# Patient Record
Sex: MALE | Race: WHITE | NOT HISPANIC OR LATINO | Employment: OTHER | URBAN - METROPOLITAN AREA
[De-identification: names, ages, dates, MRNs, and addresses within clinical notes are randomized per-mention and may not be internally consistent; named-entity substitution may affect disease eponyms.]

---

## 2022-08-23 ENCOUNTER — OFFICE VISIT (OUTPATIENT)
Dept: OBGYN CLINIC | Facility: CLINIC | Age: 67
End: 2022-08-23
Payer: MEDICARE

## 2022-08-23 ENCOUNTER — APPOINTMENT (OUTPATIENT)
Dept: RADIOLOGY | Facility: CLINIC | Age: 67
End: 2022-08-23
Payer: MEDICARE

## 2022-08-23 VITALS
SYSTOLIC BLOOD PRESSURE: 149 MMHG | WEIGHT: 225 LBS | HEART RATE: 65 BPM | BODY MASS INDEX: 30.48 KG/M2 | HEIGHT: 72 IN | DIASTOLIC BLOOD PRESSURE: 82 MMHG

## 2022-08-23 DIAGNOSIS — M25.512 LEFT SHOULDER PAIN, UNSPECIFIED CHRONICITY: ICD-10-CM

## 2022-08-23 DIAGNOSIS — S46.012A ROTATOR CUFF STRAIN, LEFT, INITIAL ENCOUNTER: Primary | ICD-10-CM

## 2022-08-23 PROCEDURE — 99204 OFFICE O/P NEW MOD 45 MIN: CPT | Performed by: ORTHOPAEDIC SURGERY

## 2022-08-23 PROCEDURE — 73030 X-RAY EXAM OF SHOULDER: CPT

## 2022-08-23 RX ORDER — MONTELUKAST SODIUM 10 MG/1
TABLET ORAL
COMMUNITY
Start: 2022-06-02

## 2022-08-23 RX ORDER — ATORVASTATIN CALCIUM 40 MG/1
TABLET, FILM COATED ORAL
COMMUNITY
Start: 2022-06-02

## 2022-08-23 RX ORDER — ALLOPURINOL 300 MG/1
TABLET ORAL
COMMUNITY
Start: 2022-06-02

## 2022-08-23 RX ORDER — COLCHICINE 0.6 MG/1
TABLET ORAL
COMMUNITY
Start: 2022-07-08

## 2022-08-23 RX ORDER — LEVOCETIRIZINE DIHYDROCHLORIDE 5 MG/1
TABLET, FILM COATED ORAL
COMMUNITY
Start: 2022-06-02

## 2022-08-23 NOTE — PROGRESS NOTES
Assessment/Plan:  1  Rotator cuff strain, left, initial encounter  Ambulatory Referral to Physical Therapy   2  Left shoulder pain, unspecified chronicity  XR shoulder 2+ vw left       Scribe Attestation    I,:  Felipa Gibson am acting as a scribe while in the presence of the attending physician :       I,:  Julio Perry MD personally performed the services described in this documentation    as scribed in my presence :             Lizy Garcia upon exam today and review of his left shoulder x-ray demonstrates signs and symptoms consistent with a rotator cuff strain  He has good functional range of motion but is painful  He demonstrates decreased strength in abduction  I do believe non operative measures of most appropriate at this time  Formal physical therapy focusing on strength of the rotator cuff will most likely help alleviate his pain  However if symptoms worsen or fail to improve with physical therapy we will consider ordering an MRI to determine a possible rotator cuff tear  He will follow up with me in 6 weeks  Subjective:   Tyrone Daigle is a 78 y o  male who presents to the office today for initial evaluation of left shoulder  5 years ago fell off a snowblower and immobilized the shoulder but recovered within a few months with no major complications reported  About 1 month ago he noticed an intense flare up of pain  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  He denotes pain when lifting the arm  He denies numbness and tingling  Resting and limiting arm responsibilities have helped alleviate some of his symptoms  Review of Systems   Constitutional: Negative for chills, fever and unexpected weight change  HENT: Negative for nosebleeds and sore throat  Eyes: Negative for pain, redness and visual disturbance  Respiratory: Negative for cough, shortness of breath and wheezing  Cardiovascular: Negative for chest pain, palpitations and leg swelling  Gastrointestinal: Negative for nausea and vomiting  Endocrine: Negative for polydipsia and polyuria  Genitourinary: Negative for dysuria and hematuria  Musculoskeletal: Positive for arthralgias and myalgias  Skin: Negative for rash and wound  Neurological: Negative for dizziness, numbness and headaches  Psychiatric/Behavioral: Negative for decreased concentration  The patient is not nervous/anxious  Past Medical History:   Diagnosis Date    Asthma     Hypercholesterolemia        Past Surgical History:   Procedure Laterality Date    BUNIONECTOMY Bilateral     FOOT SURGERY Right     INGUINAL HERNIA REPAIR      SKIN CANCER EXCISION      Malignant Melanoma on back removed    UMBILICAL HERNIA REPAIR      VASECTOMY      WISDOM TOOTH EXTRACTION         History reviewed  No pertinent family history  Social History     Occupational History    Not on file   Tobacco Use    Smoking status: Never Smoker    Smokeless tobacco: Never Used   Vaping Use    Vaping Use: Never used   Substance and Sexual Activity    Alcohol use: Not on file    Drug use: Never    Sexual activity: Not on file         Current Outpatient Medications:     allopurinol (ZYLOPRIM) 300 mg tablet, , Disp: , Rfl:     atorvastatin (LIPITOR) 40 mg tablet, , Disp: , Rfl:     colchicine (COLCRYS) 0 6 mg tablet, , Disp: , Rfl:     Fluticasone-Salmeterol (ADVAIR DISKUS IN), Inhale, Disp: , Rfl:     levocetirizine (XYZAL) 5 MG tablet, , Disp: , Rfl:     montelukast (SINGULAIR) 10 mg tablet, , Disp: , Rfl:     Ventolin  (90 Base) MCG/ACT inhaler, if needed, Disp: , Rfl:     Allergies   Allergen Reactions    Zithromax [Azithromycin] Rash       Objective:  Vitals:    08/23/22 0903   BP: 149/82   Pulse: 65       Left Shoulder Exam     Tenderness   Left shoulder tenderness location: Subacromion and bicipital groove      Range of Motion   Active abduction: 120   External rotation: 90   Internal rotation 0 degrees: T12 Muscle Strength   Abduction: 4/5   Internal rotation: 5/5   External rotation: 5/5   Supraspinatus: 4/5     Other   Erythema: absent  Scars: absent  Sensation: normal  Pulse: present             Physical Exam  HENT:      Head: Normocephalic and atraumatic  Eyes:      General:         Right eye: No discharge  Left eye: No discharge  Conjunctiva/sclera: Conjunctivae normal       Pupils: Pupils are equal, round, and reactive to light  Cardiovascular:      Rate and Rhythm: Normal rate  Pulmonary:      Effort: Pulmonary effort is normal  No respiratory distress  Musculoskeletal:      Right shoulder: Normal range of motion  Decreased strength  Cervical back: Normal range of motion and neck supple  Comments: As noted in HPI   Skin:     General: Skin is warm and dry  Neurological:      Mental Status: He is alert and oriented to person, place, and time  I have personally reviewed pertinent films in PACS and my interpretation is as follows:  X-ray of his left shoulder obtained today demonstrate no obvious acute fracture or osseous abnormality

## 2022-08-29 ENCOUNTER — EVALUATION (OUTPATIENT)
Dept: PHYSICAL THERAPY | Facility: CLINIC | Age: 67
End: 2022-08-29
Payer: MEDICARE

## 2022-08-29 DIAGNOSIS — S46.012A ROTATOR CUFF STRAIN, LEFT, INITIAL ENCOUNTER: ICD-10-CM

## 2022-08-29 DIAGNOSIS — M25.512 ACUTE PAIN OF LEFT SHOULDER: Primary | ICD-10-CM

## 2022-08-29 PROCEDURE — 97110 THERAPEUTIC EXERCISES: CPT | Performed by: PHYSICAL THERAPIST

## 2022-08-29 PROCEDURE — 97161 PT EVAL LOW COMPLEX 20 MIN: CPT | Performed by: PHYSICAL THERAPIST

## 2022-08-29 NOTE — PROGRESS NOTES
Today's date: 2022  Patient name: Laura Walker  : [birth year]  MRN: [id number]  Referring provider: Mark Larose*  Dx:   Encounter Diagnoses   Name Primary?  Rotator cuff strain, left, initial encounter     Acute pain of left shoulder Yes       Assessment:  Laura Walker is a 78 y o  male who presents with poor left shoulder scapulohumeral rhythm, hypertonicity surrounding left shoulder, left shoulder pain, decreased strength, decreased ROM and postural  dysfunction  Due to these impairments, patient has difficulty performing recreational activities, work-related activities, lifting/carrying, reaching  Patient's clinical presentation is consistent with that of the referring diagnosis  Patient has been educated in postural education, home exercise program and plan of care  Patient would benefit from skilled physical therapy services to address their aforementioned functional limitations and progress towards prior level of function and independence with home exercise program        Impairments:  pain, decreased strength, decreased ROM and postural dysfunction    Understanding of Dx/Px/POC: good  Prognosis: good    Goals:  STG:  to be achieved within 2-4 weeks  1  Pt will demo 50% improvement in shoulder AROM to improve self care and household ADLs   2  Improve shoulder strength in all deficient planes by 1/2 MMT  3  Pt will have good understanding of HEP  4  Pt will demo good sitting and standing posture  LTG:  to be achieved within 4-8 weeks  1  Improve shoulder strength to be able to operate his tractor >1 hour without pain  2  Improve strength to be able to put dishes into an overhead cabinet  3  Improve shoulder strength so that pt can lift 10 to shoulder height  4  Pt will be able to sleep without disturbance secondary to shoulder pain    5  Pt will be I with comprehensive HEP    Plan:  Plan Details:   Patient will benefit from PT eval and skilled PT that will include HEP development, stretching, strengthening, A/AA/PROM, joint mobilizations, posture education, STM/MI as needed to reduce muscle tension, therapeutic exercise, manual therapy, neuromuscular reeducation, PLOC discussed and agreed upon with patient  Planned modalities: Cold/Hot Packs, Estim prn  Frequency: 2x/wk  Duration in weeks: 6 weeks  Treatment plan discussed with: patient    Subjective:    History of Injury:  Serg Alonzo reports in 2017 he fell off a truck landing on left shoulder  He states it was sore for a couple weeks but healed on its own  He states at home he constantly  He states in about June he had a gradual onset of left shoulder pain to the point where he could barely lift his arm  He believes it has to do with the repititive use of operating his tractor which demands him to bring his arm forward and backward repetitively  He states he took a rest from using the machine and his pain did improve  He recently saw the orthopedist who took x-rays which came back normal   He has now bene referred to OPPT prior to further imaging  Pain Level:  Pain Location: Left anterior shoulder  Pain Type: Discomfort  Worst: 7/10  Best: 0/10  Current: 0/10     Aggravating factors:  Lifting overhead, reaching out to side, in front or overhead, operating tractor, intermittent disturbances to sleep  As per  FOTO pt reports:  "Some difficulty" with:    - Lower a lightweight object (1-5 lb ) from the top shelf of a closet    - Carrying a heavy object (over 10lbs)    - Reach a shelf that is at shoulder height   "Little bit of difficulty" with:    - Move a heavy skillet from one stove burner to another    - Place a can of soup (1lb) on a shelf at shoulder height     Flowsheet Rows    Flowsheet Row Most Recent Value   PT/OT G-Codes    Current Score 58   Projected Score 72           Social Support:  Lives with: Spouse  Employment Status: Retired but active outdoors doing heavy yardwork    Hand Dominance: right    Patient Goals:  Decrease Pain, Improve Motion, Improve Strength and Return to recreation      Objective Measures:    Postural Observations:  Fair, forward shoulders    Palpation:  Patient with TTP at left Biceps Tendon  Patient with hypertonicity at left Biceps Tendon, Pec Minor, Infraspinatus and Teres Minor    A/PROM IE IE      Left Right Left Right   Shld Flex 151/163 151/165     Shld Abd 99*/140* 163/162     Shld IR T8/30 T8/30     Shld ER T2/80 T2/90         MMT IE IE      Left Right Left Right   Shld Flex 4+/5* 5/5     Shld Abd 3+/5* 4+/5     Shld IR 5/5 5/5     Shld ER 4/5* 5/5      (*) denotes pain with testing  Patient demonstrated significant forward protrusion of scapular bilaterally during testing  Special Tests:  Left side:  Empty Can Test positive, Hawkin's Impingement Test negative, Neer's Impingement Test negative and Cross Body Adduction Test negative    Right side:  Empty Can Test negative, Hawkin's Impingement Test negative, Neer's Impingement Test negative and Cross Body Adduction Test negative    Joint Play:  Left Side:   Inferior Capsule: hypomobile   Posterior Capsule: hypomobile   Anterior Capsule: WNL  Right Side:   Inferior Capsule: hypomobile   Posterior Capsule: hypomobile   Anterior Capsule: WNL    Scapular Positioning/Mobility   Left: Forward protruded in sitting, unable to retract both passively or actively  Right: Forward protruded in sitting, unable to retract both passively or actively  CS Screen:   ROM: WNL   Sensation: Soft touch intact bilaterally    Flowsheet Rows    Flowsheet Row Most Recent Value   PT/OT G-Codes    Current Score 58   Projected Score 72          Precautions:  Access Code: [email or web address]  URL: [email or web address]  Date: 08/29/2022  Prepared by: Reyna Marley    Exercises  · Supine Scapular Retraction - 1-2 x daily - 7 x weekly - 2 sets - 10 reps - 5 hold  · Supine Shoulder Flexion AAROM with Dowel - 1-2 x daily - 7 x weekly - 2-3 sets - 10 reps  · Supine Shoulder External Rotation in 45 Degrees Abduction AAROM with Dowel - 1-2 x daily - 7 x weekly - 2-3 sets - 10 reps          Manuals  8/29     IE                  Neuro Re-Ed                                        Ther Ex     Sup scap retr  15x5s   Sup cane flex  15x   Sup cane 90/90 ER  15x

## 2022-08-31 ENCOUNTER — OFFICE VISIT (OUTPATIENT)
Dept: PHYSICAL THERAPY | Facility: CLINIC | Age: 67
End: 2022-08-31
Payer: MEDICARE

## 2022-08-31 DIAGNOSIS — M25.512 ACUTE PAIN OF LEFT SHOULDER: ICD-10-CM

## 2022-08-31 DIAGNOSIS — S46.012A ROTATOR CUFF STRAIN, LEFT, INITIAL ENCOUNTER: Primary | ICD-10-CM

## 2022-08-31 PROCEDURE — 97110 THERAPEUTIC EXERCISES: CPT | Performed by: PHYSICAL THERAPIST

## 2022-08-31 PROCEDURE — 97112 NEUROMUSCULAR REEDUCATION: CPT | Performed by: PHYSICAL THERAPIST

## 2022-08-31 NOTE — PROGRESS NOTES
Daily Note     Today's date: 2022  Patient name: Laura Walker  :   MRN: [id number]  Referring provider: Mark Larose*  Dx:   Encounter Diagnosis     ICD-10-CM    1  Rotator cuff strain, left, initial encounter  S46 012A    2  Acute pain of left shoulder  M25 512                   Subjective: Laura Walker reports he doesn't feel much of a difference since starting PT  Objective: See treatment diary below      Assessment: Tolerated treatment well  Patient demonstrated fatigue post treatment with extreme difficulty coordinating scapular movement into depression and retraction as he consistently overuses upper trap/lev scap  He required verbal, visual and tactile cues to improve but still had difficulty  He was able to finally achieve during [facility] extension with band  Plan: Continue per plan of care  Precautions:  Access Code: [email or web address]  URL: [email or web address]  Date: 2022  Prepared by: Ninfa Mensah    IE   Manuals     2 IE                  Neuro Re-Ed     SL scap ret/pro 20x MWM by PT    SL scap elev/dep 20x    Prone scap retr PT AA into 5s hold then eccentric lower   3x10    Band GH ext 3x10 YTB requires verbal/tactile cues for performance                     Ther Ex     Sup scap retr 15x5s 15x5s   Sup cane flex 2x10 15x   Sup cane 90/90 ER 2x10 15x   Door pec str 4x30s

## 2022-09-07 ENCOUNTER — OFFICE VISIT (OUTPATIENT)
Dept: PHYSICAL THERAPY | Facility: CLINIC | Age: 67
End: 2022-09-07
Payer: MEDICARE

## 2022-09-07 DIAGNOSIS — S46.012A ROTATOR CUFF STRAIN, LEFT, INITIAL ENCOUNTER: Primary | ICD-10-CM

## 2022-09-07 DIAGNOSIS — M25.512 ACUTE PAIN OF LEFT SHOULDER: ICD-10-CM

## 2022-09-07 PROCEDURE — 97110 THERAPEUTIC EXERCISES: CPT | Performed by: PHYSICAL THERAPIST

## 2022-09-07 PROCEDURE — 97112 NEUROMUSCULAR REEDUCATION: CPT | Performed by: PHYSICAL THERAPIST

## 2022-09-07 NOTE — PROGRESS NOTES
Daily Note     Today's date: 2022  Patient name: Serg Alonzo  :   MRN: [id number]  Referring provider: Cleveland Harris*  Dx:   Encounter Diagnosis     ICD-10-CM    1  Rotator cuff strain, left, initial encounter  S46 012A    2  Acute pain of left shoulder  M25 512                   Subjective: Serg Alonzo states he was too aggressive with his stretches over weekend and had increased pain  He took a couple days off and it calmed down  Objective: See treatment diary below      Assessment: Tolerated treatment well  Patient demonstrated fatigue post treatment with continued difficulty recruiting scapular stabilizers and posterior RTC  Plan: Continue per plan of care  Precautions:  Access Code: [email or web address]  URL: [email or web address]  Date: 2022  Prepared by: Laura Fields    IE   Manuals     3 2 IE                     Neuro Re-Ed      SL scap ret/pro  20x MWM by PT    SL scap elev/dep  20x    Prone scap retr  PT AA into 5s hold then eccentric lower   3x10    Band GH ext 3x10 YTB 5s 3x10 YTB requires verbal/tactile cues for performance      Wall serratus push ups 2x10 5s     Band ER YTB 2x15 cues for scap stab     Prone horizontal abd in er 3x10 0lb     Ther Ex      Sup scap retr  15x5s 15x5s   Sup cane flex 20x 2x10 15x   Sup cane 90/90 ER 20x 2x10 15x   Door pec str 4x30s 4x30s    Door flex str 4x30s

## 2022-09-09 ENCOUNTER — OFFICE VISIT (OUTPATIENT)
Dept: PHYSICAL THERAPY | Facility: CLINIC | Age: 67
End: 2022-09-09
Payer: MEDICARE

## 2022-09-09 DIAGNOSIS — M25.512 ACUTE PAIN OF LEFT SHOULDER: ICD-10-CM

## 2022-09-09 DIAGNOSIS — S46.012A ROTATOR CUFF STRAIN, LEFT, INITIAL ENCOUNTER: Primary | ICD-10-CM

## 2022-09-09 PROCEDURE — 97110 THERAPEUTIC EXERCISES: CPT | Performed by: PHYSICAL THERAPIST

## 2022-09-09 PROCEDURE — 97112 NEUROMUSCULAR REEDUCATION: CPT | Performed by: PHYSICAL THERAPIST

## 2022-09-09 NOTE — PROGRESS NOTES
Daily Note     Today's date: 2022  Patient name: Tyrone Daigle  :   MRN: [id number]  Referring provider: Juan Eisenberg*  Dx:   Encounter Diagnosis     ICD-10-CM    1  Rotator cuff strain, left, initial encounter  S46 012A    2  Acute pain of left shoulder  M25 512                   Subjective: Tyrone Daigle reports he wakes up stiff and sore especially if he wakes up on his side  Objective: See treatment diary below      Assessment: Tolerated treatment well  Patient demonstrated fatigue post treatment with low posterior cuff strength/endurance and continued difficulty coordinating movements and recruitment of posterior musculature  He continues to be pec and biceps dominant with movements  Plan: Continue per plan of care  Precautions:  Access Code: [email or web address]  URL: [email or web address]"/  Date: 2022  Prepared by: Juan F Ralph    IE   Manuals     4 3 2 IE   Post cap str 20x3s                    Neuro Re-Ed       SL scap ret/pro   20x MWM by PT    SL scap elev/dep   20x    Prone scap retr   PT AA into 5s hold then eccentric lower   3x10    Band GH ext  3x10 YTB 5s 3x10 YTB requires verbal/tactile cues for performance      Wall serratus push ups 2x10 5s 2x10 5s     Band ER  YTB 2x15 cues for scap stab     Seated 45/90 ER 3x10 0lb      SL ER 3x10 0lb      SL gh abd 10x w/TCs for scap dep      Prone horizontal abd in er 3x10 5s 0lb  3x10 0lb     Ther Ex       Sup scap retr   15x5s 15x5s   Sup cane flex 20x 20x 2x10 15x   Sup cane 90/90 ER 20x 20x 2x10 15x   Door pec str 4x30s 4x30s 4x30s    Door flex str 4x30s 4x30s

## 2022-09-12 ENCOUNTER — OFFICE VISIT (OUTPATIENT)
Dept: PHYSICAL THERAPY | Facility: CLINIC | Age: 67
End: 2022-09-12
Payer: MEDICARE

## 2022-09-12 DIAGNOSIS — S46.012A ROTATOR CUFF STRAIN, LEFT, INITIAL ENCOUNTER: Primary | ICD-10-CM

## 2022-09-12 DIAGNOSIS — M25.512 ACUTE PAIN OF LEFT SHOULDER: ICD-10-CM

## 2022-09-12 PROCEDURE — 97112 NEUROMUSCULAR REEDUCATION: CPT | Performed by: PHYSICAL THERAPIST

## 2022-09-12 PROCEDURE — 97110 THERAPEUTIC EXERCISES: CPT | Performed by: PHYSICAL THERAPIST

## 2022-09-12 NOTE — PROGRESS NOTES
Daily Note     Today's date: 2022  Patient name: Leslie Weiss  :   MRN: [id number]  Referring provider: Harinderalyssia Andres*  Dx:   Encounter Diagnosis     ICD-10-CM    1  Rotator cuff strain, left, initial encounter  S46 012A    2  Acute pain of left shoulder  M25 512                   Subjective: Leslie Weiss reports he had increased soreness in front of shoulder after last session but recovered well with his HEP of stretches  Objective: See treatment diary below       Assessment: Tolerated treatment well  Patient demonstrated fatigue post treatment while continuing with difficulty recruiting scapular stabilizers and inhibiting upper traps/pecs  He did perform "no money" well as way to recruit posterior cuff without significant overuse of surrounding musculature  Plan: Continue per plan of care  Precautions:  Access Code: [email or web address]  URL: [email or web address]  Date: 2022  Prepared by: Enrrique Blanca    IE   Manuals     5 4 3 2 IE   Post cap str  20x3s                      Neuro Re-Ed        SL scap ret/pro    20x MWM by PT    SL scap elev/dep    20x    Prone scap retr    PT AA into 5s hold then eccentric lower   3x10    Band GH ext 3x10 RTB requires cues for core activation and scap stab  3x10 YTB 5s 3x10 YTB requires verbal/tactile cues for performance      Sup 90/90 ER iso into MR by PT 15x5s       Sup D2 flex/ext 2x10        Wall serratus push ups Sup punch 2x10 5s 2x10 5s 2x10 5s     Band ER YTB 3x10  YTB 2x15 cues for scap stab     Seated 45/90 ER  3x10 0lb      SL ER  3x10 0lb      SL gh abd 2x10 TC for scap position 10x w/TCs for scap dep      Prone horizontal abd in er  3x10 5s 0lb  3x10 0lb     Ther Ex        Sup scap retr    15x5s 15x5s   Sup cane flex 20x 20x 20x 2x10 15x   Sup cane 90/90 ER 20x 20x 20x 2x10 15x   Door pec str  4x30s 4x30s 4x30s    Door flex str  4x30s 4x30s     0 abd ER (no money) 20x +  2x10 YTB

## 2022-09-16 ENCOUNTER — OFFICE VISIT (OUTPATIENT)
Dept: PHYSICAL THERAPY | Facility: CLINIC | Age: 67
End: 2022-09-16
Payer: MEDICARE

## 2022-09-16 DIAGNOSIS — M25.512 ACUTE PAIN OF LEFT SHOULDER: ICD-10-CM

## 2022-09-16 DIAGNOSIS — S46.012A ROTATOR CUFF STRAIN, LEFT, INITIAL ENCOUNTER: Primary | ICD-10-CM

## 2022-09-16 PROCEDURE — 97112 NEUROMUSCULAR REEDUCATION: CPT | Performed by: PHYSICAL THERAPIST

## 2022-09-16 PROCEDURE — 97110 THERAPEUTIC EXERCISES: CPT | Performed by: PHYSICAL THERAPIST

## 2022-09-16 NOTE — PROGRESS NOTES
Daily Note     Today's date: 2022  Patient name: Chin Sellers  :   MRN: [id number]  Referring provider: Velia Nguyen*  Dx:   Encounter Diagnosis     ICD-10-CM    1  Rotator cuff strain, left, initial encounter  S46 012A    2  Acute pain of left shoulder  M25 512                   Subjective: Chin Sellers reports he was having a little pain when attempting to lift arm out to side so he held off since last session  Objective: See treatment diary below      Assessment: Tolerated treatment well  Patient demonstrated fatigue post treatment in posterior RTC but with use of gravity and proprioceptive feedback from wall he did a better job at isolating posterior muscle groups and inhibiting anterior musculature  Plan: Continue per plan of care  Precautions:  Access Code: [email or web address]  URL: [email or web address]  Date: 2022  Prepared by: Mary WEST   Manuals     6 5 4 3 2   Post cap str   20x3s                     Neuro Re-Ed        SL scap ret/pro     20x MWM by PT   SL scap elev/dep     20x   Prone scap retr     PT AA into 5s hold then eccentric lower   3x10   Band GH ext  3x10 RTB requires cues for core activation and scap stab  3x10 YTB 5s 3x10 YTB requires verbal/tactile cues for performance     Sup 90/90 ER iso into MR by PT  15x5s      Sup D2 flex/ext 3x10 0lb 2x10       Stand GH flex/scap/abd to 90 10x ea w/cues for scap dep       Wall serratus push ups  Sup punch 2x10 5s 2x10 5s 2x10 5s    Band ER  YTB 3x10  YTB 2x15 cues for scap stab    Seated 45/90 ER Seated 90/90 3x10 0lb  3x10 0lb     SL ER   3x10 0lb     SL gh abd  2x10 TC for scap position 10x w/TCs for scap dep     Prone horizontal abd in er 3x10 0lb  3x10 5s 0lb  3x10 0lb    Ther Ex        Sup scap retr     15x5s   Sup cane flex  20x 20x 20x 2x10   Sup cane 90/90 ER  20x 20x 20x 2x10   Door pec str 4x30s  4x30s 4x30s 4x30s   Door flex str   4x30s 4x30s    0 abd ER (no money) Back to wall 3x10 5s 20x +  2x10 YTB      Prone GH ext Focus on scap dep 3x10 0lb       Sup hands behind head pec str 3x10 4s

## 2022-09-19 ENCOUNTER — OFFICE VISIT (OUTPATIENT)
Dept: PHYSICAL THERAPY | Facility: CLINIC | Age: 67
End: 2022-09-19
Payer: MEDICARE

## 2022-09-19 DIAGNOSIS — M25.512 ACUTE PAIN OF LEFT SHOULDER: ICD-10-CM

## 2022-09-19 DIAGNOSIS — S46.012A ROTATOR CUFF STRAIN, LEFT, INITIAL ENCOUNTER: Primary | ICD-10-CM

## 2022-09-19 PROCEDURE — 97112 NEUROMUSCULAR REEDUCATION: CPT | Performed by: PHYSICAL THERAPIST

## 2022-09-19 PROCEDURE — 97110 THERAPEUTIC EXERCISES: CPT | Performed by: PHYSICAL THERAPIST

## 2022-09-19 NOTE — PROGRESS NOTES
Daily Note     Today's date: 2022  Patient name: Kandace Lobo  :   MRN: [id number]  Referring provider: Kristina Dunn*  Dx:   Encounter Diagnosis     ICD-10-CM    1  Rotator cuff strain, left, initial encounter  S46 012A    2  Acute pain of left shoulder  M25 512                   Subjective: Kandace Lobo states he had a good weekend as he was able to use his shoulder with little to no pain  He states he also recovered well from PT session last week  Objective: See treatment diary below      Assessment: Tolerated treatment well  Patient demonstrated fatigue post treatment with continued low strength and endurance in posterior RTC impacting scapulohumeral rhythm with elevation of arm against gravity  Plan: Continue per plan of care  Precautions: Standard    Access Code: [email or web address]  URL: [email or web address]  Date: 2022  Prepared by: Ludivina Jennings    Exercises  · Supine Scapular Retraction - 1-2 x daily - 7 x weekly - 2 sets - 10 reps - 5 hold  · Doorway Pec Stretch at 90 Degrees Abduction - 2-3 x daily - 7 x weekly - 3 sets - 30 hold  · Supine Shoulder Flexion AAROM with Dowel - 1 x daily - 7 x weekly - 2-3 sets - 10 reps  · Supine Shoulder External Rotation in 45 Degrees Abduction AAROM with Dowel - 1 x daily - 7 x weekly - 2-3 sets - 10 reps  · Supine Chest Stretch with Elbows Bent - 1 x daily - 7 x weekly - 2 sets - 10 reps - 5 hold  · Prone Shoulder Extension - Single Arm - 3-4 x weekly - 3 sets - 10 reps  · Prone Shoulder Horizontal Abduction - 3-4 x weekly - 3 sets - 10 reps - 5 hold  · Shoulder External Rotation and Scapular Retraction - 3-4 x weekly - 3 sets - 10 reps  · Seated Shoulder External Rotation in Abduction Supported with Dumbbell - 3-4 x weekly - 3 sets - 10 reps          IE   Manuals     7 6 5 4 3   Post cap str    20x3s                    Neuro Re-Ed        SL scap ret/pro        SL scap elev/dep        Prone scap retr        Band GH ext   3x10 RTB requires cues for core activation and scap stab  3x10 YTB 5s   Sup 90/90 ER iso into MR by PT   15x5s     Sup D2 flex/ext  3x10 0lb 2x10      Stand GH flex/scap/abd to 90 10x ea w/ TCs 10x ea w/cues for scap dep      Wall serratus push ups   Sup punch 2x10 5s 2x10 5s 2x10 5s   Band ER   YTB 3x10  YTB 2x15 cues for scap stab   Seated 45/90 ER 90/90 3x10 0lb Seated 90/90 3x10 0lb  3x10 0lb    SL ER    3x10 0lb    SL gh abd   2x10 TC for scap position 10x w/TCs for scap dep    Prone horizontal abd in er 3x10 0lb  3x10 0lb  3x10 5s 0lb  3x10 0lb   Ther Ex        Sup scap retr        Sup cane flex   20x 20x 20x   Sup cane 90/90 ER   20x 20x 20x   Door pec str 4x30s 4x30s  4x30s 4x30s   Door flex str    4x30s 4x30s   0 abd ER (no money) Back to wall 3x10 5s Back to wall 3x10 5s 20x +  2x10 YTB     Prone GH ext 3x10 3lb Focus on scap dep 3x10 0lb      Sup hands behind head pec str 2x10 5s 3x10 4s

## 2022-09-21 ENCOUNTER — OFFICE VISIT (OUTPATIENT)
Dept: PHYSICAL THERAPY | Facility: CLINIC | Age: 67
End: 2022-09-21
Payer: MEDICARE

## 2022-09-21 DIAGNOSIS — S46.012A ROTATOR CUFF STRAIN, LEFT, INITIAL ENCOUNTER: Primary | ICD-10-CM

## 2022-09-21 DIAGNOSIS — M25.512 ACUTE PAIN OF LEFT SHOULDER: ICD-10-CM

## 2022-09-21 PROCEDURE — 97110 THERAPEUTIC EXERCISES: CPT

## 2022-09-21 PROCEDURE — 97112 NEUROMUSCULAR REEDUCATION: CPT

## 2022-09-21 NOTE — PROGRESS NOTES
Daily Note     Today's date: 2022  Patient name: Earl Redman  :   MRN: [id number]  Referring provider: Sally Carnes*  Dx:   Encounter Diagnosis     ICD-10-CM    1  Rotator cuff strain, left, initial encounter  S46 012A    2  Acute pain of left shoulder  M25 512                   Subjective: Earl Redman reports fatigue after last session but denies pain  He notes he has been working on his exercises at home but had a tough time doing the prone T's because his bed is soft  Objective: See treatment diary below      Assessment: Tolerated treatment well  Cues required to avoid compensatory shoulder hike and anterior tilt  Improved performance of prone T with tactile and verbal cues  Educated to perform off corner of bed rather than side of bed to improve his stability  Tactile and verbal cues required throughout session to improve form and reduce compensations  Patient exhibited good technique with therapeutic exercises and would benefit from continued PT to progress strength and coordination of GHJ and scapula  Plan: Continue per plan of care  Progress treatment as tolerated  Precautions: Standard    Access Code: [email or web address]  URL: [email or web address]  Date: 2022  Prepared by: Jeremiah Richard    Exercises  · Supine Scapular Retraction - 1-2 x daily - 7 x weekly - 2 sets - 10 reps - 5 hold  · Doorway Pec Stretch at 90 Degrees Abduction - 2-3 x daily - 7 x weekly - 3 sets - 30 hold  · Supine Shoulder Flexion AAROM with Dowel - 1 x daily - 7 x weekly - 2-3 sets - 10 reps  · Supine Shoulder External Rotation in 45 Degrees Abduction AAROM with Dowel - 1 x daily - 7 x weekly - 2-3 sets - 10 reps  · Supine Chest Stretch with Elbows Bent - 1 x daily - 7 x weekly - 2 sets - 10 reps - 5 hold  · Prone Shoulder Extension - Single Arm - 3-4 x weekly - 3 sets - 10 reps  · Prone Shoulder Horizontal Abduction - 3-4 x weekly - 3 sets - 10 reps - 5 hold  · Shoulder External Rotation and Scapular Retraction - 3-4 x weekly - 3 sets - 10 reps  · Seated Shoulder External Rotation in Abduction Supported with Dumbbell - 3-4 x weekly - 3 sets - 10 reps          IE 8/29  Manuals 9/21 9/19 9/16 9/12 9/9    8 7 6 5 4   Post cap str     20x3s                   Neuro Re-Ed        SL scap ret/pro        SL scap elev/dep        Prone scap retr        Band GH ext    3x10 RTB requires cues for core activation and scap stab    Sup 90/90 ER iso into MR by PT    15x5s    Sup D2 flex/ext Supine 3x10 with cues   3x10 0lb 2x10     Stand GH flex/scap/abd to 90  10x ea w/ TCs 10x ea w/cues for scap dep     Wall serratus push ups    Sup punch 2x10 5s 2x10 5s   Band ER    YTB 3x10    Seated 45/90 ER  90/90 3x10 0lb Seated 90/90 3x10 0lb  3x10 0lb   SL ER     3x10 0lb   SL gh abd 3x10 tactile and verbal cues for scap    2x10 TC for scap position 10x w/TCs for scap dep   SL flexion  10x with cues        Prone horizontal abd in er 2x10  3x10 0lb  3x10 0lb  3x10 5s 0lb    Ther Ex        Sup scap retr        Sup cane flex    20x 20x   Sup cane 90/90 ER    20x 20x   Door pec str 3x30s  4x30s 4x30s  4x30s   Door flex str     4x30s   0 abd ER (no money) 15x with tactile cues   2x10 back at wall  Back to wall 3x10 5s Back to wall 3x10 5s 20x +  2x10 YTB    Prone GH ext  3x10 3lb Focus on scap dep 3x10 0lb     Sup hands behind head pec str 10x 5s hold 2x10 5s 3x10 4s

## 2022-09-26 ENCOUNTER — OFFICE VISIT (OUTPATIENT)
Dept: PHYSICAL THERAPY | Facility: CLINIC | Age: 67
End: 2022-09-26
Payer: MEDICARE

## 2022-09-26 DIAGNOSIS — S46.012A ROTATOR CUFF STRAIN, LEFT, INITIAL ENCOUNTER: Primary | ICD-10-CM

## 2022-09-26 DIAGNOSIS — M25.512 ACUTE PAIN OF LEFT SHOULDER: ICD-10-CM

## 2022-09-26 PROCEDURE — 97112 NEUROMUSCULAR REEDUCATION: CPT

## 2022-09-26 PROCEDURE — 97110 THERAPEUTIC EXERCISES: CPT

## 2022-09-26 NOTE — PROGRESS NOTES
Daily Note     Today's date: 2022  Patient name: Mike Lopez  :   MRN: [id number]  Referring provider: Barrington Little*  Dx:   Encounter Diagnosis     ICD-10-CM    1  Rotator cuff strain, left, initial encounter  S46 012A    2  Acute pain of left shoulder  M25 512                   Subjective: Mike Lopez reports he was working on the exercises at home this weekend and feels like he was able to get the bilateral external rotation down better  He notes he still couldn't do the one laying on his stomach  Objective: See treatment diary below      Assessment: Tolerated treatment well  He continues to have difficulty coordinating scapular retractions, improved with use of foam roller posteriorly  Improved performance of bilateral shoulder ER today  Patient demonstrated fatigue post treatment, exhibited good technique with therapeutic exercises and would benefit from continued PT to progress mobility, strength and coordination  Plan: Continue per plan of care  Progress treatment as tolerated  Precautions: Standard    Access Code: [email or web address]  URL: [email or web address]  Date: 2022  Prepared by: Mohamud Stevenson    Exercises  · Supine Scapular Retraction - 1-2 x daily - 7 x weekly - 2 sets - 10 reps - 5 hold  · Doorway Pec Stretch at 90 Degrees Abduction - 2-3 x daily - 7 x weekly - 3 sets - 30 hold  · Supine Shoulder Flexion AAROM with Dowel - 1 x daily - 7 x weekly - 2-3 sets - 10 reps  · Supine Shoulder External Rotation in 45 Degrees Abduction AAROM with Dowel - 1 x daily - 7 x weekly - 2-3 sets - 10 reps  · Supine Chest Stretch with Elbows Bent - 1 x daily - 7 x weekly - 2 sets - 10 reps - 5 hold  · Prone Shoulder Extension - Single Arm - 3-4 x weekly - 3 sets - 10 reps  · Prone Shoulder Horizontal Abduction - 3-4 x weekly - 3 sets - 10 reps - 5 hold  · Shoulder External Rotation and Scapular Retraction - 3-4 x weekly - 3 sets - 10 reps  · Seated Shoulder External Rotation in Abduction Supported with Dumbbell - 3-4 x weekly - 3 sets - 10 reps          IE 8/29  Manuals 9/26 9/21 9/19 9/16 9/12    9 8 7 6 5   Post cap str        scap mobs  All directions                Neuro Re-Ed        SL scap ret/pro        SL scap elev/dep        Prone scap retr        Band GH ext     3x10 RTB requires cues for core activation and scap stab   Sup 90/90 ER iso into MR by PT     15x5s   Sup D2 flex/ext Supine 30x  Supine 3x10 with cues   3x10 0lb 2x10    Stand GH flex/scap/abd to 90   10x ea w/ TCs 10x ea w/cues for scap dep    Wall serratus push ups     Sup punch 2x10 5s   Band ER     YTB 3x10   Seated 45/90 ER   90/90 3x10 0lb Seated 90/90 3x10 0lb    SL ER        SL gh abd  3x10 tactile and verbal cues for scap    2x10 TC for scap position   SL flexion   10x with cues       Prone horizontal abd in er Prone Y arm resting on stool with cues and assist as scap 30x  2x10  3x10 0lb  3x10 0lb    Ther Ex        Sup scap retr        Sup cane flex     20x   Sup cane 90/90 ER     20x   Door pec str 3x30s  3x30s  4x30s 4x30s    Door flex str        0 abd ER (no money) 20x back at wall + ed on posture  15x with tactile cues   2x10 back at wall  Back to wall 3x10 5s Back to wall 3x10 5s 20x +  2x10 YTB   Prone GH ext   3x10 3lb Focus on scap dep 3x10 0lb    Sup hands behind head pec str  10x 5s hold 2x10 5s 3x10 4s    Standing T  Foam roller behind green tband for feedback 20x       Supine T on foam roller  10x active   10x green tband        Seated thoracic extension 2x10

## 2022-09-28 ENCOUNTER — OFFICE VISIT (OUTPATIENT)
Dept: PHYSICAL THERAPY | Facility: CLINIC | Age: 67
End: 2022-09-28
Payer: MEDICARE

## 2022-09-28 DIAGNOSIS — M25.512 ACUTE PAIN OF LEFT SHOULDER: ICD-10-CM

## 2022-09-28 DIAGNOSIS — S46.012A ROTATOR CUFF STRAIN, LEFT, INITIAL ENCOUNTER: Primary | ICD-10-CM

## 2022-09-28 PROCEDURE — 97112 NEUROMUSCULAR REEDUCATION: CPT | Performed by: PHYSICAL THERAPIST

## 2022-09-28 PROCEDURE — 97110 THERAPEUTIC EXERCISES: CPT | Performed by: PHYSICAL THERAPIST

## 2022-09-28 NOTE — PROGRESS NOTES
Daily Note     Today's date: 2022  Patient name: Tyrone Daigle  :   MRN: [id number]  Referring provider: Juan Eisenberg*  Dx:   Encounter Diagnosis     ICD-10-CM    1  Rotator cuff strain, left, initial encounter  S46 012A    2  Acute pain of left shoulder  M25 512        Start Time: 0800  Stop Time: 0845  Total time in clinic (min): 45 minutes    Subjective: Tyrone Daigle reports having "good days and bad days " States reaching [facility] is still the most difficult      Objective: See treatment diary below      Assessment: Tolerated treatment well  He continues to have difficulty coordinating scapular retractions  Some carryover noted after prone scap retractions with tactile cues  He demonstrated fatigue post treatment, exhibited good technique with therapeutic exercises and would benefit from continued PT to progress mobility, strength and coordination  Plan: Continue per plan of care  Progress treatment as tolerated  Progress Note next session     Precautions: Standard    Access Code: [email or web address]  URL: [email or web address]  Date: 2022  Prepared by: Juan F Pekaushik    Exercises  · Supine Scapular Retraction - 1-2 x daily - 7 x weekly - 2 sets - 10 reps - 5 hold  · Doorway Pec Stretch at 90 Degrees Abduction - 2-3 x daily - 7 x weekly - 3 sets - 30 hold  · Supine Shoulder Flexion AAROM with Dowel - 1 x daily - 7 x weekly - 2-3 sets - 10 reps  · Supine Shoulder External Rotation in 45 Degrees Abduction AAROM with Dowel - 1 x daily - 7 x weekly - 2-3 sets - 10 reps  · Supine Chest Stretch with Elbows Bent - 1 x daily - 7 x weekly - 2 sets - 10 reps - 5 hold  · Prone Shoulder Extension - Single Arm - 3-4 x weekly - 3 sets - 10 reps  · Prone Shoulder Horizontal Abduction - 3-4 x weekly - 3 sets - 10 reps - 5 hold  · Shoulder External Rotation and Scapular Retraction - 3-4 x weekly - 3 sets - 10 reps  · Seated Shoulder External Rotation in Abduction Supported with Dumbbell - 3-4 x weekly - 3 sets - 10 reps          IE 8/29  Manuals 9/28 9/26 9/21 9/19 9/16 9/12    10 9 8 7 6 5   Post cap str         scap mobs  All directions All directions                 Neuro Re-Ed         SL scap ret/pro Prone scap retraction 30x with 3 sec holds        SL scap elev/dep         Prone scap retr         Band GH ext      3x10 RTB requires cues for core activation and scap stab   Sup 90/90 ER iso into MR by PT      15x5s   Sup D2 flex/ext Seated with foam roll 30x LUE Supine 30x  Supine 3x10 with cues   3x10 0lb 2x10    Stand GH flex/scap/abd to 90    10x ea w/ TCs 10x ea w/cues for scap dep    Wall serratus push ups      Sup punch 2x10 5s   Band ER      YTB 3x10   Seated 45/90 ER    90/90 3x10 0lb Seated 90/90 3x10 0lb    SL ER         SL gh abd 15x LUE  3x10 tactile and verbal cues for scap    2x10 TC for scap position   SL flexion  15x LUE  10x with cues       Prone horizontal abd in er Prone Y arm resting on stool with cues and assist as scap 30x  Prone Y arm resting on stool with cues and assist as scap 30x  2x10  3x10 0lb  3x10 0lb    Ther Ex         Education Video and education on scapulothoracic rhythmn        Sup scap retr         Sup cane flex      20x   Sup cane 90/90 ER      20x   Door pec str 3x 30 sec 3x30s  3x30s  4x30s 4x30s    Door flex str         0 abd ER (no money)  20x back at wall + ed on posture  15x with tactile cues   2x10 back at wall  Back to wall 3x10 5s Back to wall 3x10 5s 20x +  2x10 YTB   Prone GH ext    3x10 3lb Focus on scap dep 3x10 0lb    Sup hands behind head pec str   10x 5s hold 2x10 5s 3x10 4s    Standing T   Foam roller behind green tband for feedback 20x       Supine T on foam roller  20x active   20x green tband  10x active   10x green tband        Seated thoracic extension 20x 2x10

## 2022-10-03 ENCOUNTER — EVALUATION (OUTPATIENT)
Dept: PHYSICAL THERAPY | Facility: CLINIC | Age: 67
End: 2022-10-03
Payer: MEDICARE

## 2022-10-03 DIAGNOSIS — S46.012A ROTATOR CUFF STRAIN, LEFT, INITIAL ENCOUNTER: Primary | ICD-10-CM

## 2022-10-03 DIAGNOSIS — M25.512 ACUTE PAIN OF LEFT SHOULDER: ICD-10-CM

## 2022-10-03 PROCEDURE — 97112 NEUROMUSCULAR REEDUCATION: CPT | Performed by: PHYSICAL THERAPIST

## 2022-10-03 PROCEDURE — 97110 THERAPEUTIC EXERCISES: CPT | Performed by: PHYSICAL THERAPIST

## 2022-10-03 NOTE — PROGRESS NOTES
Re-Evaluation    Today's date: 10/3/2022  Patient name: Emmanuel Quintana  : [birth year]  MRN: [id number]  Referring provider: Damián Campbell*  Dx:   Encounter Diagnosis     ICD-10-CM    1  Rotator cuff strain, left, initial encounter  S46 012A    2  Acute pain of left shoulder  M25 512                 Assessment:  Emmanuel Quintana has been seen for 10 visits of physical therapy  Emmanuel Quintana is demonstrating excellent progress with pain levels significantly decreased, improving RTC strength, GH ROM, and functional use of shoulder  He continues with signs of impingement at time due to forward posture, pec tightness, difficulty coordinating and recruiting scapular stabilizers + posterior RTC  He continues need for significant tactile and verbal cues for correct performance of his exercises to avoid scapular hiking and forward rounding of shoulders  Patient will continue to benefit from skilled physical therapy to continually address the remaining strength, ROM, proprioceptive deficits to be able to return to iADLs, work, recreational activities at [employer]  Pt is to be seen 1x/wk while transitioning to an I HEP to continually address the remaining deficits and decreasing reliance of PT cuing for proper performance  Impairments:  pain, decreased strength, decreased ROM and postural dysfunction    Understanding of Dx/Px/POC: good  Prognosis: good    Goals:  STG:  to be achieved within 2-4 weeks  1  Pt will demo 50% improvement in shoulder AROM to improve self care and household ADLs - Met  2  Improve shoulder strength in all deficient planes by 1/2 MMT  - Met  3  Pt will have good understanding of HEP   - Met  4  Pt will demo good sitting and standing posture  - In Progress     LTG:  to be achieved within 4-8 weeks  1  Improve shoulder strength to be able to operate his tractor >1 hour without pain  - Partially met  2  Improve strength to be able to put dishes into an overhead cabinet  - Met  3   Improve shoulder strength so that pt can lift 10 to shoulder height  - Partially Met  4  Pt will be able to sleep without disturbance secondary to shoulder pain  - Met  5  Pt will be I with comprehensive HEP - In Progress     Plan:  Plan Details:   Patient will benefit from PT eval and skilled PT that will include HEP development, stretching, strengthening, A/AA/PROM, joint mobilizations, posture education, STM/MI as needed to reduce muscle tension, therapeutic exercise, manual therapy, neuromuscular reeducation, PLOC discussed and agreed upon with patient  Planned modalities: Cold/Hot Packs, Estim prn  Frequency: 1x/wk  Duration in weeks: 6 weeks  Treatment plan discussed with: patient        Subjective: Cindy Nassar reports his shoulder is feeling much better  He no longer experiences disturbances to sleep, is able to reach and lift with little to no pain  He does 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 or noticing poor posture  Pain Level:  Pain Location: Left anterior shoulder  Pain Type: Discomfort  Worst: 2/10  Best: 0/10  Current: 0/10     Aggravating factors:  Heavy outdoor work, using chainsaw, lifting and throwing split pieces of wood, operating excavator  Flowsheet Rows    Flowsheet Row Most Recent Value   PT/OT G-Codes    Current Score 83   Projected Score 72               Objective: See treatment diary below    Postural Observations:  Fair, forward shoulders    Palpation:  Patient with no TTP or significant palpable hypertonicity surrounding left shoulder       A/PROM IE IE 10/3     Left Right Left    Shld Flex 151/163 151/165 168A    Shld Abd 99*/140* 163/162 158A    Shld IR T8/30 T8/30 T7    Shld ER T2/80 T2/90 T3        MMT IE IE 10/3     Left Right Left    Shld Flex 4+/5* 5/5 5/5    Shld Abd 3+/5* 4+/5 4+/5    Shld IR 5/5 5/5 5/5    Shld ER 4/5* 5/5 4+/5     (*) denotes pain with testing  Patient demonstrated significant forward protrusion of scapular bilaterally during testing  Special Tests:  Left side:  Empty Can Test negative, Hawkin's Impingement Test negative, Neer's Impingement Test negative and Cross Body Adduction Test negative    Right side:  Empty Can Test negative, Hawkin's Impingement Test negative, Neer's Impingement Test negative and Cross Body Adduction Test negative    Joint Play:  Left Side:   Inferior Capsule: hypomobile   Posterior Capsule: hypomobile   Anterior Capsule: WNL  Right Side:   Inferior Capsule: hypomobile   Posterior Capsule: hypomobile   Anterior Capsule: WNL    Scapular Positioning/Mobility   Left: Forward protruded in sitting, unable to retract both passively or actively  Right: Forward protruded in sitting, unable to retract both passively or actively  CS Screen:   ROM: WNL   Sensation: Soft touch intact bilaterally           Precautions: Standard    · Access Code: [email or web address]  URL: [email or web address]  Date: 09/19/2022  Prepared by: Isabel WEST 8/29  Re-Eval 10/3  Manuals 10/3 9/28 9/26 9/21 9/19    11 10 9 8 7   Post cap str        scap mobs   All directions All directions              Neuro Re-Ed        SL scap ret/pro  Prone scap retraction 30x with 3 sec holds      SL scap elev/dep        Prone scap retr        Band GH ext        Sup 90/90 ER iso into MR by PT        Sup D2 flex/ext  Seated with foam roll 30x LUE Supine 30x  Supine 3x10 with cues     Stand GH flex/scap/abd to 90     10x ea w/ TCs   Wall serratus push ups        Stand horizontal abd YTB 3x10       Seated 45/90 ER     90/90 3x10 0lb   SL ER        SL gh abd  15x LUE  3x10 tactile and verbal cues for scap     SL flexion   15x LUE  10x with cues     Prone horizontal abd in er  Prone Y arm resting on stool with cues and assist as scap 30x  Prone Y arm resting on stool with cues and assist as scap 30x  2x10  3x10 0lb    Ther Ex        Education Reviewed and updated HEP Video and education on scapulothoracic rhythmn      Sup scap retr        Sup cane flex        Sup cane 90/90 ER        Door pec str 4x30s 3x 30 sec 3x30s  3x30s  4x30s   Door flex str 3x30s       0 abd ER (no money) 30x at wall + 2x10 YTB  20x back at wall + ed on posture  15x with tactile cues   2x10 back at wall  Back to wall 3x10 5s   Prone GH ext     3x10 3lb   Sup hands behind head pec str    10x 5s hold 2x10 5s   Standing T    Foam roller behind green tband for feedback 20x     Supine T on foam roller   20x active   20x green tband  10x active   10x green tband      Seated thoracic extension  20x 2x10

## 2022-10-04 ENCOUNTER — OFFICE VISIT (OUTPATIENT)
Dept: OBGYN CLINIC | Facility: CLINIC | Age: 67
End: 2022-10-04
Payer: MEDICARE

## 2022-10-04 VITALS
SYSTOLIC BLOOD PRESSURE: 160 MMHG | DIASTOLIC BLOOD PRESSURE: 83 MMHG | HEIGHT: 72 IN | BODY MASS INDEX: 31.37 KG/M2 | WEIGHT: 231.6 LBS | HEART RATE: 72 BPM

## 2022-10-04 DIAGNOSIS — S46.012D ROTATOR CUFF STRAIN, LEFT, SUBSEQUENT ENCOUNTER: Primary | ICD-10-CM

## 2022-10-04 PROCEDURE — 99213 OFFICE O/P EST LOW 20 MIN: CPT | Performed by: ORTHOPAEDIC SURGERY

## 2022-10-04 RX ORDER — CHLORAL HYDRATE 500 MG
CAPSULE ORAL DAILY
COMMUNITY

## 2022-10-04 RX ORDER — ASPIRIN 81 MG/1
TABLET ORAL DAILY
COMMUNITY

## 2022-10-04 NOTE — PROGRESS NOTES
Assessment/Plan:  1  Rotator cuff strain, left, subsequent encounter       Scribe Attestation    I,:  Tarah Alia am acting as a scribe while in the presence of the attending physician :       I,:  Fara Farooq MD personally performed the services described in this documentation    as scribed in my presence :         Archana Cisneros upon exam today and review of his recent PT notes demonstrates excellent improvements in his range of motion and strength following left rotator cuff strain  I am very pleased with his progress today  I believe he is cleared to transition to his home exercise program at this time and should perform his exercises at least once per day  He may continue with activities of daily living as tolerated  Archanatasha Cisneros verbalized understanding and had no further questions today  He may follow up with me on an as-needed basis  Subjective:   Obie Hernandez is a 78 y o  male who presents for follow up evaluation of his left shoulder  He reports significant improvement today and feels 100% better than when we saw him last  He was compliant with physical therapy and is transitioning to a home exercise program   He denies pain today  He has returned to daily activities without pain or difficulty  He has been able to sleep on the left shoulder without pain as well  Review of Systems   Constitutional: Negative for chills, fever and unexpected weight change  HENT: Negative for nosebleeds and sore throat  Eyes: Negative for pain, redness and visual disturbance  Respiratory: Negative for cough, shortness of breath and wheezing  Cardiovascular: Negative for chest pain, palpitations and leg swelling  Gastrointestinal: Negative for nausea and vomiting  Endocrine: Negative for polydipsia and polyuria  Genitourinary: Negative for dysuria and hematuria  Musculoskeletal: Negative for arthralgias and myalgias  As noted in HPI   Skin: Negative for rash and wound     Neurological: Negative for dizziness, numbness and headaches  Psychiatric/Behavioral: Negative for decreased concentration  The patient is not nervous/anxious  Past Medical History:   Diagnosis Date    Asthma     Hypercholesterolemia        Past Surgical History:   Procedure Laterality Date    BUNIONECTOMY Bilateral     FOOT SURGERY Right     INGUINAL HERNIA REPAIR      SKIN CANCER EXCISION      Malignant Melanoma on back removed    UMBILICAL HERNIA REPAIR      VASECTOMY      WISDOM TOOTH EXTRACTION         History reviewed  No pertinent family history  Social History     Occupational History    Not on file   Tobacco Use    Smoking status: Never Smoker    Smokeless tobacco: Never Used   Vaping Use    Vaping Use: Never used   Substance and Sexual Activity    Alcohol use: Not on file    Drug use: Never    Sexual activity: Not on file         Current Outpatient Medications:     allopurinol (ZYLOPRIM) 300 mg tablet, , Disp: , Rfl:     aspirin (ECOTRIN LOW STRENGTH) 81 mg EC tablet, Take by mouth daily, Disp: , Rfl:     atorvastatin (LIPITOR) 40 mg tablet, , Disp: , Rfl:     colchicine (COLCRYS) 0 6 mg tablet, , Disp: , Rfl:     Fluticasone-Salmeterol (ADVAIR DISKUS IN), Inhale, Disp: , Rfl:     levocetirizine (XYZAL) 5 MG tablet, , Disp: , Rfl:     montelukast (SINGULAIR) 10 mg tablet, , Disp: , Rfl:     Omega-3 Fatty Acids (fish oil) 1,000 mg, Take by mouth daily, Disp: , Rfl:     Ventolin  (90 Base) MCG/ACT inhaler, if needed, Disp: , Rfl:     Allergies   Allergen Reactions    Zithromax [Azithromycin] Rash       Objective:  Vitals:    10/04/22 0813   BP: 160/83   Pulse: 72       Left Shoulder Exam     Tenderness   The patient is experiencing no tenderness       Range of Motion   Active abduction: 160   External rotation: 80   Internal rotation 0 degrees: T12     Muscle Strength   Abduction: 5/5   Internal rotation: 5/5   External rotation: 5/5   Supraspinatus: 5/5   Subscapularis: 5/5   Biceps: 5/5     Tests   Impingement: positive (slightly)    Other   Sensation: normal  Pulse: present             Physical Exam  HENT:      Head: Normocephalic and atraumatic  Eyes:      General:         Right eye: No discharge  Left eye: No discharge  Conjunctiva/sclera: Conjunctivae normal       Pupils: Pupils are equal, round, and reactive to light  Cardiovascular:      Rate and Rhythm: Normal rate  Pulmonary:      Effort: Pulmonary effort is normal  No respiratory distress  Musculoskeletal:         General: No swelling  Normal range of motion  Left shoulder: No swelling  Normal range of motion  Cervical back: Normal range of motion and neck supple  Comments: As noted in HPI   Skin:     General: Skin is warm and dry  Neurological:      Mental Status: He is alert and oriented to person, place, and time  I have personally reviewed pertinent films in PACS and my interpretation is as follows: No imaging to review  This document was created using speech voice recognition software  Grammatical errors, random word insertions, pronoun errors, and incomplete sentences are an occasional consequence of this system due to software limitations, ambient noise, and hardware issues  Any formal questions or concerns about content, text, or information contained within the body of this dictation should be directly addressed to the provider for clarification

## 2022-10-05 ENCOUNTER — APPOINTMENT (OUTPATIENT)
Dept: PHYSICAL THERAPY | Facility: CLINIC | Age: 67
End: 2022-10-05

## 2022-10-10 ENCOUNTER — OFFICE VISIT (OUTPATIENT)
Dept: PHYSICAL THERAPY | Facility: CLINIC | Age: 67
End: 2022-10-10
Payer: MEDICARE

## 2022-10-10 DIAGNOSIS — S46.012A ROTATOR CUFF STRAIN, LEFT, INITIAL ENCOUNTER: Primary | ICD-10-CM

## 2022-10-10 DIAGNOSIS — M25.512 ACUTE PAIN OF LEFT SHOULDER: ICD-10-CM

## 2022-10-10 PROCEDURE — 97110 THERAPEUTIC EXERCISES: CPT

## 2022-10-10 PROCEDURE — 97112 NEUROMUSCULAR REEDUCATION: CPT

## 2022-10-19 NOTE — PROGRESS NOTES
Daily Note     Today's date: 10/10/2022  Patient name: Hilda Jaeger  :   MRN: [id number]  Referring provider: Mustapha Mcintosh*  Dx:   Encounter Diagnosis     ICD-10-CM    1  Rotator cuff strain, left, initial encounter  S46 012A    2  Acute pain of left shoulder  M25 [id number]                   Subjective: Pt reports he saw MD and was very pleased with his progress  He states "I think today should be good"      Objective: See treatment diary below      Assessment: Tolerated treatment well  Patient exhibited good technique with therapeutic exercises and would benefit from continued PT  Pt demo good understanding of HEP and was given extra tband  Plan: Discharge      Precautions: Standard    · Access Code: [email or web address]  URL: [email or web address]  Date: 2022  Prepared by: Shyla Pérez      IE   Re-Eval 10/3  Manuals 10/3 10/10 9/28 9/26 9/21    11 12 10 9 8   Post cap str        scap mobs    All directions All directions             Neuro Re-Ed        SL scap ret/pro  Prone scap retraction 30x Prone scap retraction 30x with 3 sec holds     SL scap elev/dep        Prone scap retr        Band GH ext        Sup 90/90 ER iso into MR by PT        Sup D2 flex/ext   Seated with foam roll 30x LUE Supine 30x  Supine 3x10 with cues    Stand GH flex/scap/abd to 90        Wall serratus push ups        Stand horizontal abd YTB 3x10 YTB 3x10      Seated 45/90 ER        SL ER  1# 20x      SL gh abd   15x LUE  3x10 tactile and verbal cues for scap    SL flexion   20x 1# 15x LUE  10x with cues    Prone horizontal abd in er   Prone Y arm resting on stool with cues and assist as scap 30x  Prone Y arm resting on stool with cues and assist as scap 30x  2x10    Ther Ex        Education Reviewed and updated HEP Rev Video and education on scapulothoracic rhythmn     Sup scap retr        Sup cane flex        Sup cane 90/90 ER        Door pec str 4x30s 4x30s 3x 30 sec 3x30s  3x30s    Door flex str 3x30s [facility]     Outpatient Follow Up Note  Dr Hallie Bonilla MD,  Shannon Smith RN, APRN,CNP CVNP      CHIEF COMPLAINT / HPI:  Follow Up after infer MI      Cee Decker is 48 y.o. male who presents today with a history of     Acute ST elevation myocardial infarction (STEMI) involving other coronary artery of inferior wall (HCC)  Continue on aspirin, statin  and Plavix  Continue on lisinopril on beta-blocker   Patient is a chronic tobacco abuse, told regarding smoking cessation  Chest X-ray nonacute  HLD  started statin   States has tooth infection /  to see dentist soon if possible      [facility] [id number] PCI performed to the proximal RCA  3.0x22 mm [place] HUNTER  Post dilation with a 3.0mm NC  Excellent post angiographic result with BAILEY 3 flow    Interval history:  VSS wt stable   Right wrist no complications   Not interest in cardiac rehab  Discussed nutrition / sodium intake / fluid intake   Recommend activity as tolerated   Recommend to stop smoking cigarettes   Discussed dental care ASAP       I spent a total of 35 minutes and greater than 50% of the time was spent counseling with patient  coordinating care regarding her diagnosis, reviewing labs, cardiac work up, treatments and plan of care. No past medical history on file. Social History:    Social History     Tobacco Use   Smoking Status Every Day    Packs/day: 1.50    Types: Cigarettes   Smokeless Tobacco Never     Current Medications:  Current Outpatient Medications   Medication Sig Dispense Refill    nitroGLYCERIN (NITROSTAT) 0.4 MG SL tablet Place 1 tablet under the tongue every 5 minutes as needed for Chest pain up to max of 3 total doses.  If no relief after 1 dose, call 911. 25 tablet 3    aspirin 81 MG chewable tablet Take 1 tablet by mouth daily 30 tablet 3    atorvastatin (LIPITOR) 80 MG tablet Take 1 tablet by mouth nightly 30 tablet 3    lisinopril (PRINIVIL;ZESTRIL) 5 MG tablet Take 1 tablet by mouth daily 30 tablet 3 0 abd ER (no money) 30x at wall + 2x10 YTB 30x at wall  20x back at wall + ed on posture  15x with tactile cues   2x10 back at wall    Prone GH ext        Sup hands behind head pec str     10x 5s hold   Repeated shld extension  20x       Standing T     Foam roller behind green tband for feedback 20x    Supine T on foam roller    20x active   20x green tband  10x active   10x green tband     Seated thoracic extension   20x 2x10 metoprolol succinate (TOPROL XL) 25 MG extended release tablet Take 1 tablet by mouth daily 30 tablet 3    clopidogrel (PLAVIX) 75 MG tablet Take 1 tablet by mouth daily 30 tablet 3     No current facility-administered medications for this visit. REVIEW OF SYSTEMS:    CONSTITUTIONAL: No major weight gain or loss, night sweats, fever, fatigue, or weakness. HEENT: No new vision difficulties or ringing in the ears. RESPIRATORY: No new SOB, PND, orthopnea or cough. CARDIOVASCULAR: See HPI  GI: No N/V/D, constipation, or abdominal pain. No black/tarry stools  : No urinary urgency, incontinence, or hematuria. SKIN: No cyanosis or skin lesions. MUSCULOSKELETAL: No new muscle or joint pain. NEUROLOGICAL: No syncope or TIA-like symptoms. PSYCHIATRIC: No anxiety, pain, insomnia or depression        Vitals:    10/19/22 1045   BP: 120/70   Site: Left Upper Arm   Position: Sitting   Cuff Size: Medium Adult   Pulse: 88   SpO2: 96%   Weight: 201 lb 9.6 oz (91.4 kg)   Height: 5' 9\" (1.753 m)      VITALS:  /70 (Site: Left Upper Arm, Position: Sitting, Cuff Size: Medium Adult)   Pulse 88   Ht 5' 9\" (1.753 m)   Wt 201 lb 9.6 oz (91.4 kg)   SpO2 96%   BMI 29.77 kg/m²   CONSTITUTIONAL: Cooperative, no apparent distress, and appears well nourished / developed  NEUROLOGIC:  Awake and orientated to person, place, and time. PSYCH: Calm affect. SKIN: Warm and dry. HEENT: Sclera non-icteric, normocephalic, neck supple. RESPIRATORY:  No increased work of breathing and clear to auscultation, no crackles or wheezing. CARDIOVASCULAR:  Regular rate and rhythm without murmur. Normal S1 and S2. No gallops or rubs. Normal PMI. No elevation of JVP. Normal carotid pulses with no bruits. GI:  Normal bowel sounds. Non-distended. Non-tender to palpation  EXT: No edema. No calf tenderness. Pulses are present bilaterally.     Wt Readings from Last 3 Encounters:   10/19/22 201 lb 9.6 oz (91.4 kg)   10/07/22 200 lb (90.7 kg) Pulse Readings from Last 3 Encounters:   10/19/22 88   10/09/22 83     BP Readings from Last 3 Encounters:   10/19/22 120/70   10/09/22 108/78        DATA:    Lab Results   Component Value Date    ALT 35 10/09/2022    AST 93 (H) 10/09/2022    ALKPHOS 103 10/09/2022    BILITOT 1.0 10/09/2022     Lab Results   Component Value Date    CREATININE 0.8 (L) 10/09/2022    BUN 10 10/09/2022     10/09/2022    K 4.1 10/09/2022     10/09/2022    CO2 24 10/09/2022       Lab Results   Component Value Date    WBC 17.2 (H) 10/09/2022    HGB 15.2 10/09/2022    HCT 44.7 10/09/2022    MCV 94.6 10/09/2022     10/09/2022     No components found for: CHLPL  Lab Results   Component Value Date    TRIG 113 10/08/2022     Lab Results   Component Value Date    HDL 31 (L) 10/08/2022     Lab Results   Component Value Date    LDLCALC 116 (H) 10/08/2022     Lab Results   Component Value Date    LABVLDL 23 10/08/2022     Radiology Review:  Pertinent images / reports were reviewed as a part of this visit and reveals the following:    Assessment:     history of  acute ST elevation myocardial infarction (STEMI) involving other coronary artery of inferior wall (HCC)  Continue on aspirin, statin  and Plavix  Continue on lisinopril on beta-blocker     10/8/2022 TTE Left ventricular cavity size is normal. Normal left ventricular wall thickness. Ejection fraction is visually estimated to be 50-55%. No regional wall motion abnormalities are noted. Normal diastolic function. Mild aortic regurgitation is present. IVC size is dilated (>2.1 cm) and collapses < 50% with respiration consistent with elevated RA pressure (15 mmHg).     [facility] [id number] PCI performed to the proximal RCA  3.0x22 mm [place] HUNTER  Post dilation with a 3.0mm NC  Excellent post angiographic result with BAILEY 3 flow    Patient is a chronic tobacco abuse  told regarding smoking cessation    HLD  started statin     States has tooth infection /  to see dentist soon if possible   States doesn't have insurance  Discussed low cost dentist at [address] to see APAP   Will prescribe Augmentin  x one prescription (until gets to dentist)      Plan:   Cont GDMT   no NSAIDS only tylenol PRN   Fu with Dr Margo Barone as planned   Blood work in approx 6 months       One [facility]     Documentation of today's visit sent to PCP

## 2023-09-19 ENCOUNTER — TELEPHONE (OUTPATIENT)
Dept: OBGYN CLINIC | Facility: CLINIC | Age: 68
End: 2023-09-19

## 2023-09-19 NOTE — TELEPHONE ENCOUNTER
L/m offering appointment with Laura Vega in [facility] in [address] [place] [address] office Wednesday or Thursday

## 2023-09-20 ENCOUNTER — OFFICE VISIT (OUTPATIENT)
Dept: PAIN MEDICINE | Facility: CLINIC | Age: 68
End: 2023-09-20
Payer: MEDICARE

## 2023-09-20 ENCOUNTER — APPOINTMENT (OUTPATIENT)
Dept: RADIOLOGY | Facility: CLINIC | Age: 68
End: 2023-09-20
Payer: MEDICARE

## 2023-09-20 VITALS
BODY MASS INDEX: 30.79 KG/M2 | TEMPERATURE: 98.6 F | DIASTOLIC BLOOD PRESSURE: 75 MMHG | SYSTOLIC BLOOD PRESSURE: 154 MMHG | WEIGHT: 227 LBS | HEART RATE: 89 BPM

## 2023-09-20 DIAGNOSIS — M79.18 MYOFASCIAL PAIN SYNDROME: ICD-10-CM

## 2023-09-20 DIAGNOSIS — M54.50 LOW BACK PAIN, UNSPECIFIED BACK PAIN LATERALITY, UNSPECIFIED CHRONICITY, UNSPECIFIED WHETHER SCIATICA PRESENT: Primary | ICD-10-CM

## 2023-09-20 DIAGNOSIS — M54.50 LOW BACK PAIN, UNSPECIFIED BACK PAIN LATERALITY, UNSPECIFIED CHRONICITY, UNSPECIFIED WHETHER SCIATICA PRESENT: ICD-10-CM

## 2023-09-20 PROCEDURE — 72110 X-RAY EXAM L-2 SPINE 4/>VWS: CPT

## 2023-09-20 PROCEDURE — 99204 OFFICE O/P NEW MOD 45 MIN: CPT | Performed by: STUDENT IN AN ORGANIZED HEALTH CARE EDUCATION/TRAINING PROGRAM

## 2023-09-20 RX ORDER — EZETIMIBE 10 MG/1
TABLET ORAL
COMMUNITY
Start: 2023-08-10

## 2023-09-20 NOTE — PATIENT INSTRUCTIONS
Core Strengthening Exercises   WHAT YOU NEED TO KNOW:   Your core includes the muscles of your lower back, hip, pelvis, and abdomen. Core strengthening exercises help heal and strengthen these muscles. This helps prevent another injury, and keeps your pelvis, spine, and hips in the correct position. DISCHARGE INSTRUCTIONS:   Call your doctor or physical therapist if:   You have sharp or worsening pain during exercise or at rest.    You have questions or concerns about your shoulder exercises. Safety tips:  Talk to your healthcare provider before you start an exercise program. A physical therapist can teach you how to do core strengthening exercises safely. Do the exercises on a mat or firm surface. A firm surface will support your spine and prevent low back pain. Do not do these exercises on a bed. Move slowly and smoothly. Avoid fast or jerky motions. Stop if you feel pain. You may feel some discomfort at first, but you should not feel pain. Tell your provider or physical therapist if you have pain while you exercise. Regular exercise will help decrease your discomfort over time. Breathe normally during core exercises. Do not hold your breath. This may cause an increase in blood pressure and prevent muscle strengthening. Your healthcare provider will tell you when to inhale and exhale during the exercise. Begin all of your exercises with abdominal bracing. Abdominal bracing helps warm up your core muscles. You can also practice abdominal bracing throughout the day. Lie on your back with your knees bent and feet flat on the floor. Place your arms in a relaxed position beside your body. Tighten your abdominal muscles. Pull your belly button in and up toward your spine. Hold for 5 seconds. Relax your muscles. Repeat 10 times. Core strengthening exercises: Your healthcare provider will tell you how often to do these exercises.  The provider will also tell you how many repetitions of each exercise you should do. Hold each exercise for 5 seconds or as directed. As you get stronger, increase your hold to 10 to 15 seconds. You can do some of these exercises on a stability ball, or with a weight. Ask your healthcare provider how to use a stability ball or weight for these exercises:  Bridging:  Lie on your back with your knees bent and feet flat on the floor. Rest your arms at your side. Tighten your buttocks, and then lift your hips 1 inch off the floor. Hold for 5 seconds. When you can do this exercise without pain for 10 seconds, increase the distance you lift your hips. A good goal is to be able to lift your hips so that your shoulders, hips, and knees are in a straight line. Dead bug:  Lie on your back with your knees bent and feet flat on the floor. Place your arms in a relaxed position beside your body. Begin with abdominal bracing. Next, raise one leg, keeping your knee bent. Hold for 5 seconds. Repeat with the other leg. When you can do this exercise without pain for 10 to 15 seconds, you may raise one straight leg and hold. Repeat with the other leg. Quadruped:  Place your hands and knees on the floor. Keep your wrists directly below your shoulders and your knees directly below your hips. Pull your belly button in toward your spine. Do not flatten or arch your back. Tighten your abdominal muscles below your belly button. Hold for 5 seconds. When you can do this exercise without pain for 10 to 15 seconds, you may extend one arm and hold. Repeat on the other side. Side bridge exercises:      Standing side bridge:  Stand next to a wall and extend one arm toward the wall. Place your palm flat on the wall with your fingers pointing upward. Begin with abdominal bracing. Next, without moving your feet, slowly bend your arm to 90 degrees. Hold for 5 seconds. Repeat on the other side.  When you can do this exercise without pain for 10 to 15 seconds, you may do the bent leg side bridge on the floor. Bent leg side bridge:  Lie on one side with your legs, hips, and shoulders in a straight line. Prop yourself up onto your forearm so your elbow is directly below your shoulder. Bend your knees back to 90 degrees. Begin with abdominal bracing. Next, lift your hips and balance yourself on your forearm and knees. Hold for 5 seconds. Repeat on the other side. When you can do this exercise without pain for 10 to 15 seconds, you may do the straight leg side bridge on the floor. Straight leg side bridge:  Lie on one side with your legs, hips, and shoulders in a straight line. Prop yourself up onto your forearm so your elbow is directly below your shoulder. Begin with abdominal bracing. Lift your hips off the floor and balance yourself on your forearm and the outside of your flexed foot. Do not let your ankle bend sideways. Hold for 5 seconds. Repeat on the other side. When you can do this exercise without pain for 10 to 15 seconds, ask your healthcare provider for more advanced exercises. Superman:  Lie on your stomach. Extend your arms forward on the floor. Tighten your abdominal muscles and lift your right hand and left leg off the floor. Hold this position. Slowly return to the starting position. Tighten your abdominal muscles and lift your left hand and right leg off the floor. Hold this position. Slowly return to the starting position. Clam:  Lie on your side with your knees bent. Put your bottom arm under your head to keep your neck in line. Put your top hand on your hip to keep your pelvis from moving. Put your heels together, and keep them together during this exercise. Slowly raise your top knee toward the ceiling. Then lower your leg so your knees are together. Repeat this exercise 10 times. Then switch sides and do the exercise 10 times with the other leg. Curl up:  Lie on your back with your knees bent and feet flat on the floor.  Place your hands, palms down, underneath your lower back. Next, with your elbows on the floor, lift your shoulders and chest 2 to 3 inches off the floor. Keep your head in line with your shoulders. Hold this position. Slowly return to the starting position. Straight leg raises:  Lie on your back with one leg straight. Bend the other knee and place your foot flat on the floor. Tighten your abdominal muscles. Keep your leg straight and slowly lift it straight up 6 to 12 inches off the floor. Hold this position. Lower your leg slowly. Do as many repetitions as directed on this side. Repeat with the other leg. Plank:  Lie on your stomach. Bend your elbows and place your forearms flat on the floor. Lift your chest, stomach, and knees off the floor. Make sure your elbows are below your shoulders. Your body should be in a straight line. Do not let your hips or lower back sink to the ground. Squeeze your abdominal muscles together and hold for 15 seconds. To make this exercise harder, hold for 30 seconds or lift 1 leg at a time. Bicycles:  Lie on your back. Bend both knees and bring them toward your chest. Your calves should be parallel to the floor. Place the palms of your hands on the back of your head. Straighten your right leg and keep it lifted 2 inches off the floor. Raise your head and shoulders off the floor and twist towards your left. Keep your head and shoulders lifted. Bend your right knee while you straighten your left leg. Keep your left leg 2 inches off the floor. Twist your head and chest towards the left leg. Continue to straighten 1 leg at a time and twist.       Follow up with your doctor or physical therapist as directed:  Write down your questions so you remember to ask them during your visits. © Copyright [employer]ae 2023 Information is for End User's use only and may not be sold, redistributed or otherwise used for commercial purposes. The above information is an  only.  It is not intended as medical advice for individual conditions or treatments. Talk to your doctor, nurse or pharmacist before following any medical regimen to see if it is safe and effective for you.
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
## 2023-10-03 ENCOUNTER — EVALUATION (OUTPATIENT)
Dept: PHYSICAL THERAPY | Facility: CLINIC | Age: 68
End: 2023-10-03
Payer: MEDICARE

## 2023-10-03 DIAGNOSIS — M54.16 LUMBAR RADICULOPATHY: ICD-10-CM

## 2023-10-03 DIAGNOSIS — M54.50 LOW BACK PAIN, UNSPECIFIED BACK PAIN LATERALITY, UNSPECIFIED CHRONICITY, UNSPECIFIED WHETHER SCIATICA PRESENT: Primary | ICD-10-CM

## 2023-10-03 PROCEDURE — 97161 PT EVAL LOW COMPLEX 20 MIN: CPT

## 2023-10-03 PROCEDURE — 97110 THERAPEUTIC EXERCISES: CPT
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
## 2023-10-10 ENCOUNTER — OFFICE VISIT (OUTPATIENT)
Dept: PHYSICAL THERAPY | Facility: CLINIC | Age: 68
End: 2023-10-10
Payer: MEDICARE

## 2023-10-10 DIAGNOSIS — M54.50 LOW BACK PAIN, UNSPECIFIED BACK PAIN LATERALITY, UNSPECIFIED CHRONICITY, UNSPECIFIED WHETHER SCIATICA PRESENT: Primary | ICD-10-CM

## 2023-10-10 DIAGNOSIS — M54.16 LUMBAR RADICULOPATHY: ICD-10-CM

## 2023-10-10 PROCEDURE — 97112 NEUROMUSCULAR REEDUCATION: CPT

## 2023-10-10 PROCEDURE — 97110 THERAPEUTIC EXERCISES: CPT

## 2023-10-10 NOTE — PROGRESS NOTES
Daily Note     Today's date: 10/10/2023  Patient name: Adriel Huitron  : 1955  MRN: [id number]  Referring provider: Onelia Ramirez MD  Dx:   Encounter Diagnosis     ICD-10-CM    1. Low back pain, unspecified back pain laterality, unspecified chronicity, unspecified whether sciatica present  M54.50       2. Lumbar radiculopathy  M54.16           Start Time: 2753  Stop Time: 935  Total time in clinic (min): 51 minutes    Subjective: Pt reports that after he left his last session his upper back and L low back/hip flared up so he stopped his exercises for the rest of the day. He tried doing his exercises as prescribed but couldn't get to them as often, didn't feel much relief so he wasn't sure if he was doing them correctly. States 4-5/10 pain in his SI joint/glute max region prior to the start of his treatment session. Objective: See treatment diary below      Assessment: Tolerated treatment well. Patient demonstrated fatigue post treatment, exhibited good technique with therapeutic exercises and would benefit from continued PT. Pt responded better to static loading of his lumbar spine than the prone press-ups, which he responded well to during his eval, with decreasing hip pain down to 2-3/10 and less sharpness. Gave pt extension based exercises which didn't provoke his pain, more muscle fatigue/soreness. Pt also felt "looser" after performing posterior hip stretches. HEP updated based on today's findings. Plan: Continue per plan of care. Progress treatment as tolerated.          Precautions: hx of melanoma  HEP: repeated prone press-ups 1-2 sets of 10 every 2 hours + posture    Date    10/10 10/3/23   Visit Number    2 1 (IE)   Manuals                                        Neuro Re-Ed         bridges    2x10    Prone hip ext    10x B    glute sets    2x10    Bird dogs                                Ther Ex        Prone press-ups    1x10- NE  1x10- NE    Wall lean ext 1x10- slightly better SANDRA    3 mins- better    YULIYA    3x30s B    Piriformis str    3x30s B                                    Ther Activity                        Gait Training                        Modalities

## 2023-10-12 ENCOUNTER — OFFICE VISIT (OUTPATIENT)
Dept: PHYSICAL THERAPY | Facility: CLINIC | Age: 68
End: 2023-10-12
Payer: MEDICARE

## 2023-10-12 DIAGNOSIS — M54.50 LOW BACK PAIN, UNSPECIFIED BACK PAIN LATERALITY, UNSPECIFIED CHRONICITY, UNSPECIFIED WHETHER SCIATICA PRESENT: Primary | ICD-10-CM

## 2023-10-12 DIAGNOSIS — M54.16 LUMBAR RADICULOPATHY: ICD-10-CM

## 2023-10-12 PROCEDURE — 97110 THERAPEUTIC EXERCISES: CPT

## 2023-10-12 PROCEDURE — 97112 NEUROMUSCULAR REEDUCATION: CPT

## 2023-10-12 NOTE — PROGRESS NOTES
Daily Note     Today's date: 10/12/2023  Patient name: Neville Ceballos  : 1955  MRN: [id number]  Referring provider: Bandar Penny MD  Dx:   Encounter Diagnosis     ICD-10-CM    1. Low back pain, unspecified back pain laterality, unspecified chronicity, unspecified whether sciatica present  M54.50       2. Lumbar radiculopathy  M54.16           Start Time: 1258  Stop Time: 932  Total time in clinic (min): 45 minutes    Subjective: Pt states that he was feeling better after his last session and even yesterday is back was better without any groin pain, however last night and this morning his back pain returned as well as the testicle pain. Pt states stiffness, moderate low back and groin pain prior to the start of his treatment session. Objective: See treatment diary below      Assessment: Tolerated treatment well. Patient demonstrated fatigue post treatment, exhibited good technique with therapeutic exercises, and would benefit from continued PT      Plan: Continue per plan of care. Progress treatment as tolerated.        Precautions: hx of melanoma  HEP: repeated prone press-ups 1-2 sets of 10 every 2 hours + posture    Date   10/12 10/10 10/3/23   Visit Number   3 2 1 (IE)   Manuals                                        Neuro Re-Ed         bridges   2x10 w/VC for glute set 2x10    Prone hip ext    10x B    glute sets    2x10    Bird dogs        TA w/pelvic tilt   Posterior 10x- cramping  Anterior 10x-better                     Ther Ex        Prone press-ups   Wall lean ext 3x10- looser but no pain change 1x10- NE  1x10- NE    Wall lean ext 1x10- slightly better    SANDRA   3 mins 3 mins- better    YULIYA   2x30s B 3x30s B    Piriformis str   2x30s B  3x30s B    LTR   10x B     Seated flex   Forward on ball 2x10                     Ther Activity                        Gait Training                        Modalities

## 2023-10-17 ENCOUNTER — OFFICE VISIT (OUTPATIENT)
Dept: PHYSICAL THERAPY | Facility: CLINIC | Age: 68
End: 2023-10-17
Payer: MEDICARE

## 2023-10-17 DIAGNOSIS — M54.50 LOW BACK PAIN, UNSPECIFIED BACK PAIN LATERALITY, UNSPECIFIED CHRONICITY, UNSPECIFIED WHETHER SCIATICA PRESENT: Primary | ICD-10-CM

## 2023-10-17 DIAGNOSIS — M54.16 LUMBAR RADICULOPATHY: ICD-10-CM

## 2023-10-17 PROCEDURE — 97140 MANUAL THERAPY 1/> REGIONS: CPT

## 2023-10-17 PROCEDURE — 97110 THERAPEUTIC EXERCISES: CPT

## 2023-10-17 NOTE — PROGRESS NOTES
Daily Note     Today's date: 10/17/2023  Patient name: Rylan Espinosa  : 1955  MRN: [id number]  Referring provider: Vandana Atkins MD  Dx:   Encounter Diagnosis     ICD-10-CM    1. Low back pain, unspecified back pain laterality, unspecified chronicity, unspecified whether sciatica present  M54.50       2. Lumbar radiculopathy  M54.16           Start Time: 0800  Stop Time: 0845  Total time in clinic (min): 45 minutes    Subjective: Pt reports that over the weekend his testicle pain and low back pain got really bad so he stopped most of his activities and exercises trying to rest it. Pt thinks the one hip stretch may have aggravated his symptoms. Pt states when he does his exercises he does the back exercises 1-2x/day. Pt presents today with that continued increased pain in his low back and R testicle. Objective: See treatment diary below      Assessment: Tolerated treatment well. Patient demonstrated fatigue post treatment, exhibited good technique with therapeutic exercises, and would benefit from continued PT. Repeated motion testing was performed again into extension to see if it still benefits him. After several sets pt was able to ambulate with significantly less pain in both his back and testicle, minimal pain remaining. Hip stretches, manual stretching, and LAD had no affect on his low back or testicle pain. Pt was re-educated on the importance of posture and consistently performing his exercises as prescribed to see if he will truly get progress from PT or if he will need to be referred out for more imaging and/or pelvic PT. Plan: Continue per plan of care. Progress treatment as tolerated.        Precautions: hx of melanoma  HEP: repeated prone press-ups 1-2 sets of 10 every 2 hours + posture    Date  10/17 10/12 10/10 10/3/23   Visit Number  4 3 2 1 (IE)   Manuals        LAD  BT performed      Hip ADD str  BT performed                      Neuro Re-Ed         bridges   2x10 w/VC for glute set 2x10    Prone hip ext    10x B    glute sets    2x10    Bird dogs        TA w/pelvic tilt   Posterior 10x- cramping  Anterior 10x-better     Bent knee fallouts  10x B  10x RLE only              Ther Ex        Prone press-ups  Prone press-ups 3x10- better    Wall lean ext 3x10- looser and improved pain Wall lean ext 3x10- looser but no pain change 1x10- NE  1x10- NE    Wall lean ext 1x10- slightly better    SANDRA   3 mins 3 mins- better    YULIYA  3x30s B 2x30s B 3x30s B    Piriformis str   2x30s B  3x30s B    LTR   10x B     Seated flex   Forward on ball 2x10     Supine log rolls (ER/IR)  10x B              Ther Activity                        Gait Training                        Modalities

## 2023-10-19 ENCOUNTER — OFFICE VISIT (OUTPATIENT)
Dept: PHYSICAL THERAPY | Facility: CLINIC | Age: 68
End: 2023-10-19
Payer: MEDICARE

## 2023-10-19 DIAGNOSIS — M54.50 LOW BACK PAIN, UNSPECIFIED BACK PAIN LATERALITY, UNSPECIFIED CHRONICITY, UNSPECIFIED WHETHER SCIATICA PRESENT: Primary | ICD-10-CM

## 2023-10-19 DIAGNOSIS — M54.16 LUMBAR RADICULOPATHY: ICD-10-CM

## 2023-10-19 PROCEDURE — 97112 NEUROMUSCULAR REEDUCATION: CPT

## 2023-10-19 PROCEDURE — 97110 THERAPEUTIC EXERCISES: CPT

## 2023-10-19 NOTE — PROGRESS NOTES
Daily Note     Today's date: 10/19/2023  Patient name: Maura Martinez  : 1955  MRN: [id number]  Referring provider: Martha Magallanes MD  Dx:   Encounter Diagnosis     ICD-10-CM    1. Low back pain, unspecified back pain laterality, unspecified chronicity, unspecified whether sciatica present  M54.50       2. Lumbar radiculopathy  M54.16           Start Time:   Stop Time: 930  Total time in clinic (min): 40 minutes    Subjective: Pt states that he feels better than he did on Tuesday with both his back pain and testicle pain, however it continues to persist. He has been more diligent about performing his HEP and does feel a little better. Pt also feels that the LAD last week helped his testicle pain. No new complaints. Objective: See treatment diary below      Assessment: Tolerated treatment well. Patient demonstrated fatigue post treatment, exhibited good technique with therapeutic exercises, and would benefit from continued PT. Due to continued testicle and low back pain, pt may be dealing with a pelvic floor issue, and has difficulty coordinating his abdominal activation. Added in exercises today to help address these issues. Plan: Continue per plan of care. Progress treatment as tolerated.        Precautions: hx of melanoma  HEP: Access Code [email or web address]    Date 10/19 10/17 10/12 10/10 10/3/23   Visit Number 5 4 3 2 1 (IE)   Manuals        LAD BT performed BT performed      Hip ADD str  BT performed                      Neuro Re-Ed         bridges   2x10 w/VC for glute set 2x10    Prone hip ext    10x B    glute sets Supine 2x10   2x10    Bird dogs        TA w/pelvic tilt Posterior w/VC & TC 3x10  Posterior 10x- cramping  Anterior 10x-better     Bent knee fallouts 2x10 B w/GTB 10x B  10x RLE only      Modified deadbug 10xB w/PPT       Ther Ex        Prone press-ups Wall lean ext 2x10 Prone press-ups 3x10- better    Wall lean ext 3x10- looser and improved pain Wall lean ext 3x10- looser but no pain change 1x10- NE  1x10- NE    Wall lean ext 1x10- slightly better    SANDRA   3 mins 3 mins- better    YULIYA 2x30s B 3x30s B 2x30s B 3x30s B    Piriformis str   2x30s B  3x30s B    LTR   10x B     Seated flex   Forward on ball 2x10     Supine log rolls (ER/IR)  10x B      Hip add ball squeeze 2x10       Ther Activity                        Gait Training                        Modalities

## 2023-10-23 ENCOUNTER — OFFICE VISIT (OUTPATIENT)
Dept: PHYSICAL THERAPY | Facility: CLINIC | Age: 68
End: 2023-10-23
Payer: MEDICARE

## 2023-10-23 DIAGNOSIS — M54.16 LUMBAR RADICULOPATHY: ICD-10-CM

## 2023-10-23 DIAGNOSIS — M54.50 LOW BACK PAIN, UNSPECIFIED BACK PAIN LATERALITY, UNSPECIFIED CHRONICITY, UNSPECIFIED WHETHER SCIATICA PRESENT: Primary | ICD-10-CM

## 2023-10-23 PROCEDURE — 97110 THERAPEUTIC EXERCISES: CPT

## 2023-10-23 PROCEDURE — 97112 NEUROMUSCULAR REEDUCATION: CPT

## 2023-10-23 NOTE — PROGRESS NOTES
Daily Note     Today's date: 10/23/2023  Patient name: Joaquin Hooper  : 1955  MRN: [id number]  Referring provider: Jonathan Rapp MD  Dx:   Encounter Diagnosis     ICD-10-CM    1. Low back pain, unspecified back pain laterality, unspecified chronicity, unspecified whether sciatica present  M54.50       2. Lumbar radiculopathy  M54.16           Start Time: 0800  Stop Time: 0845  Total time in clinic (min): 45 minutes    Subjective: Pt reports that he still is getting his testicle pain but it goes away as the day progresses. Pt noticed over the weekend that he isn't getting hip pain anymore and can sleep through the night like he couldn't before. Pt states testicle and back pain prior to the start of his session. Also notes he isn't getting to the extensions as much as he should. Objective: See treatment diary below      Assessment: Tolerated treatment well. Patient demonstrated fatigue post treatment, exhibited good technique with therapeutic exercises, and would benefit from continued PT      Plan: Continue per plan of care. Progress treatment as tolerated.        Precautions: hx of melanoma  HEP: Access Code [email or web address]    Date 10/23 10/19 10/17 10/12 10/10   Visit Number 6 5 4 3 2   Manuals        LAD  BT performed BT performed     Hip ADD str   BT performed                     Neuro Re-Ed         bridges 2x10 w/VC for glute set   2x10 w/VC for glute set 2x10   squats 10x w/VC       Prone hip ext     10x B   glute sets  Supine 2x10   2x10   Bird dogs        TA w/pelvic tilt Posterior w/VC & TC 3x10 Posterior w/VC & TC 3x10  Posterior 10x- cramping  Anterior 10x-better    Bent knee fallouts 15x B w/blue 2x10 B w/GTB 10x B  10x RLE only     Modified deadbug 2x10 B w/PPT 10xB w/PPT      Ther Ex        Prone press-ups Wall lean ext 3x10 Wall lean ext 2x10 Prone press-ups 3x10- better    Wall lean ext 3x10- looser and improved pain Wall lean ext 3x10- looser but no pain change 1x10- NE  1x10- NE    Wall lean ext 1x10- slightly better   SANDRA    3 mins 3 mins- better   YULIYA  2x30s B 3x30s B 2x30s B 3x30s B   Piriformis str    2x30s B  3x30s B   LTR    10x B    Seated flex    Forward on ball 2x10    Supine log rolls (ER/IR) 10x ea RLE  10x B     Hip add ball squeeze 2x10 2x10      Ther Activity                        Gait Training                        Modalities

## 2023-10-24 ENCOUNTER — APPOINTMENT (OUTPATIENT)
Dept: PHYSICAL THERAPY | Facility: CLINIC | Age: 68
End: 2023-10-24
Payer: MEDICARE

## 2023-10-26 ENCOUNTER — OFFICE VISIT (OUTPATIENT)
Dept: PHYSICAL THERAPY | Facility: CLINIC | Age: 68
End: 2023-10-26
Payer: MEDICARE

## 2023-10-26 DIAGNOSIS — M54.50 LOW BACK PAIN, UNSPECIFIED BACK PAIN LATERALITY, UNSPECIFIED CHRONICITY, UNSPECIFIED WHETHER SCIATICA PRESENT: Primary | ICD-10-CM

## 2023-10-26 DIAGNOSIS — M54.16 LUMBAR RADICULOPATHY: ICD-10-CM

## 2023-10-26 PROCEDURE — 97112 NEUROMUSCULAR REEDUCATION: CPT

## 2023-10-26 PROCEDURE — 97110 THERAPEUTIC EXERCISES: CPT

## 2023-10-26 NOTE — PROGRESS NOTES
Daily Note     Today's date: 10/26/2023  Patient name: Joya Proctor  : 1955  MRN: [id number]  Referring provider: Leslie Morales MD  Dx:   Encounter Diagnosis     ICD-10-CM    1. Low back pain, unspecified back pain laterality, unspecified chronicity, unspecified whether sciatica present  M54.50       2. Lumbar radiculopathy  M54.16           Start Time: 847  Stop Time: 930  Total time in clinic (min): 43 minutes    Subjective: Pt reports that he had a really bad day yesterday, after midday his testicle pain began to increase and it was very tough to sleep. Previously he had progressed to where his hips weren't bothering him sleeping but that returned. Pt states high intensity testicle pain and low back tightness/soreness prior to the start of his session. Objective: See treatment diary below      Assessment: Tolerated treatment well. Patient demonstrated fatigue post treatment, exhibited good technique with therapeutic exercises, and would benefit from continued PT. Pt continues to have difficulty with lower abdominal activation, performing PPT, and coordinating hip mobility. Pt was re-educated on what exercises he should focus on at home to reduce his hip and testicular pain. Plan: Continue per plan of care. Progress treatment as tolerated.        Precautions: hx of melanoma  HEP: Access Code [email or web address]    Date 10/26 10/23 10/19 10/17 10/12   Visit Number [phone] 4 3   Manuals        LAD   BT performed BT performed    Hip ADD str    BT performed                    Neuro Re-Ed         bridges  2x10 w/VC for glute set   2x10 w/VC for glute set   squats  10x w/VC      Prone hip ext        glute sets Supine 3x10  Supine 2x10     Bird dogs        TA w/pelvic tilt Posterior w/VC & TC 4x10 Posterior w/VC & TC 3x10 Posterior w/VC & TC 3x10  Posterior 10x- cramping  Anterior 10x-better   Bent knee fallouts 2x10 B w/blue 15x B w/blue 2x10 B w/GTB 10x B  10x RLE only    Modified deadbug  2x10 B w/PPT 10xB w/PPT     Ther Ex        Prone press-ups Wall lean ext 2x20 Wall lean ext 3x10 Wall lean ext 2x10 Prone press-ups 3x10- better    Wall lean ext 3x10- looser and improved pain Wall lean ext 3x10- looser but no pain change   SANDRA     3 mins   YULIYA 3x30s B  2x30s B 3x30s B 2x30s B   Piriformis str     2x30s B    LTR     10x B   Seated flex     Forward on ball 2x10   Supine log rolls (ER/IR) 10x ea B 10x ea RLE  10x B    Hip add ball squeeze 3x10 2x10 2x10     Ther Activity                        Gait Training                        Modalities

## 2023-10-30 ENCOUNTER — APPOINTMENT (OUTPATIENT)
Dept: PHYSICAL THERAPY | Facility: CLINIC | Age: 68
End: 2023-10-30
Payer: MEDICARE

## 2023-11-01 ENCOUNTER — APPOINTMENT (OUTPATIENT)
Dept: PHYSICAL THERAPY | Facility: CLINIC | Age: 68
End: 2023-11-01
Payer: MEDICARE

## 2023-11-06 ENCOUNTER — OFFICE VISIT (OUTPATIENT)
Dept: PHYSICAL THERAPY | Facility: CLINIC | Age: 68
End: 2023-11-06
Payer: MEDICARE

## 2023-11-06 DIAGNOSIS — M54.50 LOW BACK PAIN, UNSPECIFIED BACK PAIN LATERALITY, UNSPECIFIED CHRONICITY, UNSPECIFIED WHETHER SCIATICA PRESENT: Primary | ICD-10-CM

## 2023-11-06 DIAGNOSIS — M54.16 LUMBAR RADICULOPATHY: ICD-10-CM

## 2023-11-06 PROCEDURE — 97112 NEUROMUSCULAR REEDUCATION: CPT

## 2023-11-06 PROCEDURE — 97110 THERAPEUTIC EXERCISES: CPT

## 2023-11-06 NOTE — PROGRESS NOTES
Daily Note     Today's date: 2023  Patient name: Lisa Montoya  : 1955  MRN: [id number]  Referring provider: Elmer Griffin MD  Dx:   Encounter Diagnosis     ICD-10-CM    1. Low back pain, unspecified back pain laterality, unspecified chronicity, unspecified whether sciatica present  M54.50       2. Lumbar radiculopathy  M54.16           Start Time: 0850  Stop Time: 930  Total time in clinic (min): 40 minutes    Subjective: Pt states that he is feeling much better. Pt had no back, hip pain for the past week and states minimal testicle pain. He has been consistent with his HEP which he thinks is helping. Objective: See treatment diary below      Assessment: Tolerated treatment well. Patient demonstrated fatigue post treatment, exhibited good technique with therapeutic exercises, and would benefit from continued PT. Continued with abdominal activation exercises which appears to be decreasing and abolishing his testicle pain. Plan: Continue per plan of care. Progress treatment as tolerated.        Precautions: hx of melanoma  HEP: Access Code [email or web address]    Date 11/6 10/26 10/23 10/19 10/17   Visit Number [phone] 4   Manuals        LAD    BT performed BT performed   Hip ADD str     BT performed                   Neuro Re-Ed         bridges 2x10 w/VC for glute set  2x10 w/VC for glute set     squats   10x w/VC     Prone hip ext        glute sets Supine 3x10 Supine 3x10  Supine 2x10    Bird dogs        TA w/pelvic tilt Posterior w/TC 3x10    Seated on pball TA w/march 10x B    Supine TA w/march 2x10 B Posterior w/VC & TC 4x10 Posterior w/VC & TC 3x10 Posterior w/VC & TC 3x10    Bent knee fallouts 2x10 B w/blue 2x10 B w/blue 15x B w/blue 2x10 B w/GTB 10x B  10x RLE only   Modified deadbug   2x10 B w/PPT 10xB w/PPT    Ther Ex        Prone press-ups Wall lean ext 3x10 Wall lean ext 2x20 Wall lean ext 3x10 Wall lean ext 2x10 Prone press-ups 3x10- better    Wall lean ext 3x10- looser and improved pain SANDRA        YULIYA  3x30s B  2x30s B 3x30s B   Piriformis str        LTR        Seated flex        Seated hip ER/IR 2x10 ea B       Supine log rolls (ER/IR) 2x10 ea B 10x ea B 10x ea RLE  10x B   Hip add ball squeeze  3x10 2x10 2x10    Ther Activity                        Gait Training                        Modalities

## 2023-11-08 ENCOUNTER — OFFICE VISIT (OUTPATIENT)
Dept: PHYSICAL THERAPY | Facility: CLINIC | Age: 68
End: 2023-11-08
Payer: MEDICARE

## 2023-11-08 DIAGNOSIS — M54.50 LOW BACK PAIN, UNSPECIFIED BACK PAIN LATERALITY, UNSPECIFIED CHRONICITY, UNSPECIFIED WHETHER SCIATICA PRESENT: Primary | ICD-10-CM

## 2023-11-08 DIAGNOSIS — M54.16 LUMBAR RADICULOPATHY: ICD-10-CM

## 2023-11-08 PROCEDURE — 97110 THERAPEUTIC EXERCISES: CPT

## 2023-11-08 PROCEDURE — 97112 NEUROMUSCULAR REEDUCATION: CPT

## 2023-11-08 NOTE — PROGRESS NOTES
Daily Note     Today's date: 2023  Patient name: Candida Doran  : 1955  MRN: [id number]  Referring provider: Laxmi Pham MD  Dx:   Encounter Diagnosis     ICD-10-CM    1. Low back pain, unspecified back pain laterality, unspecified chronicity, unspecified whether sciatica present  M54.50       2. Lumbar radiculopathy  M54.16           Start Time: 0800  Stop Time: 0845  Total time in clinic (min): 45 minutes    Subjective: Pt reports that he slept through the night yesterday for the first time in a long time. Pt feels sitting in his car causes a lot of his pain but when he is up and moving it is better. He denies any low back/hip pain prior to the start of his session, 4-5/10 groin pain. Objective: See treatment diary below      Assessment: Tolerated treatment well. Patient demonstrated fatigue post treatment, exhibited good technique with therapeutic exercises, and would benefit from continued PT. Continues to respond well to abdominal activation exercises with a decrease in his groin pain. Progressing his core strengthening as tolerated with moderate VC and TC for abdominal recruitment and stability. Plan: Continue per plan of care. Progress treatment as tolerated.        Precautions: hx of melanoma  HEP: Access Code [email or web address]    Date 11/8 11/6 10/26 10/23 10/19   Visit Number [phone]   Manuals        LAD     BT performed   Hip ADD str                        Neuro Re-Ed         bridges W/abd stab 2x10 w/VC 2x10 w/VC for glute set  2x10 w/VC for glute set    squats    10x w/VC    Prone hip ext        glute sets  Supine 3x10 Supine 3x10  Supine 2x10   Bird dogs        TA w/pelvic tilt Posterior w/VC 3x10- decreased pain    Supine TA w/march 2x10 B Posterior w/TC 3x10    Seated on pball TA w/march 10x B    Supine TA w/march 2x10 B Posterior w/VC & TC 4x10 Posterior w/VC & TC 3x10 Posterior w/VC & TC 3x10   Seated bosu LE press w/TA act 15x B       Bent knee fallouts  2x10 B w/blue 2x10 B w/blue 15x B w/blue 2x10 B w/GTB   Paloff walkout W/TA act 4x       Bird dog 2x10 B       Modified deadbug 2x10 B w/PPT   2x10 B w/PPT 10xB w/PPT   Ther Ex        Prone press-ups Wall lean ext 3x10 Wall lean ext 3x10 Wall lean ext 2x20 Wall lean ext 3x10 Wall lean ext 2x10   SANDRA        YULIYA   3x30s B  2x30s B   Piriformis str        LTR        Seated flex        Seated hip ER/IR 15x B 2x10 ea B      Supine log rolls (ER/IR)  2x10 ea B 10x ea B 10x ea RLE    Hip add ball squeeze   3x10 2x10 2x10   Ther Activity                        Gait Training                        Modalities

## 2023-11-09 NOTE — PROGRESS NOTES
PT Re-Evaluation     Today's date: 2023  Patient name: Miguel Gilman  : 1955  MRN: [id number]  Referring provider: Kvng Brewer MD  Dx:   Encounter Diagnosis     ICD-10-CM    1. Low back pain, unspecified back pain laterality, unspecified chronicity, unspecified whether sciatica present  M54.50       2. Lumbar radiculopathy  M54.16           Start Time: 0800  Stop Time: 0845  Total time in clinic (min): 45 minutes    Assessment  Assessment details: Pt is a 80 y.o male who presents to skilled physical therapy for his 9th visit with complaints of chronic low back pain that radiates into the R hip and R groin/testicle. Pt continues to demonstrate an asymmetric and antalgic gait pattern, poor posture, and impaired lumbar AROM globally with pain during flexion. Pt continues to respond well to extension based exercises, however no impact on his groin pain. Recently had been addressing pt's lower abdominal activation, he has difficulty coordinating abdominal activation/coordination. However, when he is able to activate his lower abdominals & work his pelvic muscles his groin pain significantly decreases. Pt has been making progress with his lumbar ROM, pain, and overall function since he started, however deficits persist. Pt was re-educated on his diagnosis, prognosis, POC, and HEP. Pt's remaining pain and deficits are preventing him from performing self care, ADLs, recreational activities, and work duties without compensations. Pt would benefit from skilled physical therapy to reduce his pain, address his deficits, progress towards his goals, and return to his PLOF. Impairments: abnormal gait, abnormal or restricted ROM, abnormal movement, activity intolerance, pain with function, poor posture  and poor body mechanics    Symptom irritability: moderateUnderstanding of Dx/Px/POC: good   Prognosis: good    Goals  Short Term Goals:  1) Pt will initiate and progress his HEP in 3-4 weeks. - Met  2) Pt will improve his sitting and standing posture to at least fair without pain in 3-4 weeks. - Progressing  3) Pt will improve his lumbar AROM to minimal restrictions with less than 2/10 pain in 3-4 weeks to improve ADLs. - Progressing    Long Term Goals:  1) Pt will be able to sit and lay down for at least 30-45 mins with less than 2/10 pain in 6-8 weeks. - Progressing  2) Pt will be able to sleep throughout the night without pain, 6-8 hours, in 6-8 weeks. - Met  3) Pt will be able to get in and out of the car without low back pain in 6-8 weeks. - Progressing  4) Pt will be able to perform ADLs with less than 2/10 pain in 6-8 weeks. -Progressing    Plan  Patient would benefit from: skilled physical therapy and PT eval  Planned modality interventions: cryotherapy and thermotherapy: hydrocollator packs  Planned therapy interventions: activity modification, joint mobilization, ADL retraining, manual therapy, balance, motor coordination training, neuromuscular re-education, body mechanics training, coordination, patient education, postural training, self care, strengthening, stretching, therapeutic activities, therapeutic exercise, flexibility, functional ROM exercises, gait training, graded exercise and home exercise program  Frequency: 2x week  Duration in weeks: 8  Plan of Care beginning date: 11/8/2023  Plan of Care expiration date: 1/3/2024  Treatment plan discussed with: patient        Subjective Evaluation    History of Present Illness  Mechanism of injury: 11/8/23 Update:  Pt states that things have been progressing over the past several visits. He believes that the wall extensions and abdominal exercises are helping a lot of his symptoms. Pt finally was able to sleep throughout the night without waking up from pain. If he sits for a prolonged time his pain will come back but he sees progress. His hip pain is mostly gone, low back pain is better, testicle pain is the worst of the three.    Patient Goals  Patient goals for therapy: decreased pain, increased motion, increased strength, independence with ADLs/IADLs and return to sport/leisure activities    Pain  Pain scale: 0/10 low back, 4/10 testicle. At best pain ratin (0/10 low back, 2/10 testicle)  At worst pain ratin  Location: R low back into the glute, R groin/testicle  Quality: dull ache, throbbing and tight  Relieving factors: medications (move around)  Aggravating factors: sitting, standing, lifting and stair climbing  Progression: improved    Social Support  Steps to enter house: yes  Stairs in house: yes   Lives in: multiple-level home  Lives with: spouse    Employment status: not working    Diagnostic Tests  X-ray: abnormal (degenerative changes)        Objective     Concurrent Complaints  Positive for night pain. Negative for disturbed sleep, bladder dysfunction, bowel dysfunction and saddle (S4) numbness    Static Posture     Head  Forward. Shoulders  Asymmetric shoulders, elevated and rounded.     Postural Observations  Seated posture: poor  Standing posture: poor  Correction of posture: makes symptoms better      Active Range of Motion     Lumbar   Flexion:  with pain Restriction level: moderate  Extension:  Restriction level: minimal  Left lateral flexion:  Restriction level: minimal  Right lateral flexion:  Restriction level: minimal  Left rotation:  Restriction level: minimal  Right rotation:  Restriction level: minimal  Mechanical Assessment    Cervical      Thoracic      Lumbar    Standing flexion: repeated movements   Pain location:no change  Pain intensity: worse  Pain level: increased  Standing extension: repeated movements  Pain location: no change  Pain intensity: better  Pain level: decreased  Lying extension: repeated movements  Pain intensity: better  Pain level: decreased    Ambulation   Weight-Bearing Status   Weight-Bearing Status (Left): full weight bearing   Weight-Bearing Status (Right): full weight-bearing    Assistive device used: none    Observational Gait   Gait: antalgic and asymmetric   Walking speed within functional limits. Increased left stance time and right swing time. Decreased stride length, right stance time, left swing time, left step length and right step length. Left foot contact pattern: heel to toe  Right foot contact pattern: heel to toe  Left arm swing: decreased  Base of support: normal    Functional Assessment      Squat    Trunk lean left, left tibial anterior translation beyond toes and right tibial anterior translation beyond toes.                  Precautions: hx of melanoma  HEP: Access Code [email or web address]    Date 11/8 11/6 10/26 10/23 10/19 10/17   Visit Number [phone] 4   Manuals         LAD     BT performed BT performed   Hip ADD str      BT performed                     Neuro Re-Ed          bridges 2x10 w/VC for glute set 2x10 w/VC for glute set  2x10 w/VC for glute set     squats    10x w/VC     Prone hip ext         glute sets  Supine 3x10 Supine 3x10  Supine 2x10    Bird dogs         TA w/pelvic tilt Posterior w/TC 3x10    Supine w/marches 2x10 B Posterior w/TC 3x10    Seated on pball TA w/march 10x B    Supine TA w/march 2x10 B Posterior w/VC & TC 4x10 Posterior w/VC & TC 3x10 Posterior w/VC & TC 3x10    Bent knee fallouts  2x10 B w/blue 2x10 B w/blue 15x B w/blue 2x10 B w/GTB 10x B  10x RLE only   Paloff walkout 4x ea        Bird dog 2x10 B        Modified deadbug 15x B   2x10 B w/PPT 10xB w/PPT    Ther Ex         Prone press-ups Wall lean ext 3x10 Wall lean ext 3x10 Wall lean ext 2x20 Wall lean ext 3x10 Wall lean ext 2x10 Prone press-ups 3x10- better    Wall lean ext 3x10- looser and improved pain   SANDRA         YULIYA   3x30s B  2x30s B 3x30s B   Piriformis str         LTR         Seated flex         Seated hip ER/IR  2x10 ea B       Supine log rolls (ER/IR)  2x10 ea B 10x ea B 10x ea RLE  10x B   Hip add ball squeeze   3x10 2x10 2x10    Ther Activity                           Gait Training Modalities

## 2023-11-15 ENCOUNTER — APPOINTMENT (OUTPATIENT)
Dept: PHYSICAL THERAPY | Facility: CLINIC | Age: 68
End: 2023-11-15
Payer: MEDICARE

## 2023-11-17 ENCOUNTER — OFFICE VISIT (OUTPATIENT)
Dept: PHYSICAL THERAPY | Facility: CLINIC | Age: 68
End: 2023-11-17
Payer: MEDICARE

## 2023-11-17 DIAGNOSIS — M54.50 LOW BACK PAIN, UNSPECIFIED BACK PAIN LATERALITY, UNSPECIFIED CHRONICITY, UNSPECIFIED WHETHER SCIATICA PRESENT: Primary | ICD-10-CM

## 2023-11-17 DIAGNOSIS — M54.16 LUMBAR RADICULOPATHY: ICD-10-CM

## 2023-11-17 PROCEDURE — 97112 NEUROMUSCULAR REEDUCATION: CPT | Performed by: PHYSICAL THERAPIST

## 2023-11-17 PROCEDURE — 97110 THERAPEUTIC EXERCISES: CPT | Performed by: PHYSICAL THERAPIST

## 2023-11-17 NOTE — PROGRESS NOTES
Daily Note     Today's date: 2023  Patient name: Joaquin Hooper  : 1955  MRN: [id number]  Referring provider: Jonathan Rapp MD  Dx:   Encounter Diagnosis     ICD-10-CM    1. Low back pain, unspecified back pain laterality, unspecified chronicity, unspecified whether sciatica present  M54.50       2. Lumbar radiculopathy  M54.16           Start Time: 2990  Stop Time: 933  Total time in clinic (min): 43 minutes    Subjective: Client reports finding good relief with his "wall exercise" He reports the testicular pain is decreasing. Objective: See treatment diary below      Assessment: Tolerated treatment well. He demonstrated fatigue post treatment, exhibited good technique with therapeutic exercises, and would benefit from continued PT. By end of session, client with full resolution of testicular pain and mild localized back pain. All activities completed to tolerance. Plan: Continue per plan of care. Progress treatment as tolerated.        Precautions: hx of melanoma  HEP: Access Code [email or web address]    Date 11/17 11/8 11/6 10/26 10/23 10/19 10/17   Visit Number [phone] 4   Manuals          LAD      BT performed BT performed   Hip ADD str       BT performed                       Neuro Re-Ed           bridges 2x15 w/VC for glute set 2x10 w/VC for glute set 2x10 w/VC for glute set  2x10 w/VC for glute set     squats     10x w/VC     Prone hip ext          glute sets   Supine 3x10 Supine 3x10  Supine 2x10    Bird dogs          TA w/pelvic tilt Posterior w/TC 3x10    Supine w/marches 2x10 BLE Posterior w/TC 3x10    Supine w/marches 2x10 B Posterior w/TC 3x10    Seated on pball TA w/march 10x B    Supine TA w/march 2x10 B Posterior w/VC & TC 4x10 Posterior w/VC & TC 3x10 Posterior w/VC & TC 3x10    Bent knee fallouts   2x10 B w/blue 2x10 B w/blue 15x B w/blue 2x10 B w/GTB 10x B  10x RLE only   Paloff walkout 10x each way 4x ea        Bird dog  2x10 B        Modified deadbug  15x B   2x10 B w/PPT 10xB w/PPT    Ther Ex          Prone press-ups Completed at home prior to PT session Wall lean ext 3x10 Wall lean ext 3x10 Wall lean ext 2x20 Wall lean ext 3x10 Wall lean ext 2x10 Prone press-ups 3x10- better    Wall lean ext 3x10- looser and improved pain   SANDRA          YULIYA    3x30s B  2x30s B 3x30s B   Piriformis str          LTR          Seated flex          Seated hip ER/IR 2x10 each LE  2x10 ea B       Supine log rolls (ER/IR)   2x10 ea B 10x ea B 10x ea RLE  10x B   Hip add ball squeeze    3x10 2x10 2x10    Ther Activity                              Gait Training                              Modalities

## 2023-11-20 ENCOUNTER — APPOINTMENT (OUTPATIENT)
Dept: PHYSICAL THERAPY | Facility: CLINIC | Age: 68
End: 2023-11-20
Payer: MEDICARE

## 2023-11-22 ENCOUNTER — APPOINTMENT (OUTPATIENT)
Dept: PHYSICAL THERAPY | Facility: CLINIC | Age: 68
End: 2023-11-22
Payer: MEDICARE

## 2023-11-29 ENCOUNTER — APPOINTMENT (OUTPATIENT)
Dept: PHYSICAL THERAPY | Facility: CLINIC | Age: 68
End: 2023-11-29
Payer: MEDICARE

## 2023-12-01 ENCOUNTER — OFFICE VISIT (OUTPATIENT)
Dept: PHYSICAL THERAPY | Facility: CLINIC | Age: 68
End: 2023-12-01
Payer: MEDICARE

## 2023-12-01 DIAGNOSIS — M54.16 LUMBAR RADICULOPATHY: ICD-10-CM

## 2023-12-01 DIAGNOSIS — M54.50 LOW BACK PAIN, UNSPECIFIED BACK PAIN LATERALITY, UNSPECIFIED CHRONICITY, UNSPECIFIED WHETHER SCIATICA PRESENT: Primary | ICD-10-CM

## 2023-12-01 PROCEDURE — 97110 THERAPEUTIC EXERCISES: CPT

## 2023-12-01 PROCEDURE — 97112 NEUROMUSCULAR REEDUCATION: CPT

## 2023-12-01 NOTE — PROGRESS NOTES
Daily Note     Today's date: 2023  Patient name: Maura Martinez  : 1955  MRN: [id number]  Referring provider: Martha Magallanes MD  Dx:   Encounter Diagnosis     ICD-10-CM    1. Low back pain, unspecified back pain laterality, unspecified chronicity, unspecified whether sciatica present  M54.50       2. Lumbar radiculopathy  M54.16           Start Time: 852  Stop Time: 933  Total time in clinic (min): 41 minutes    Subjective: Pt states being sick for the past two weeks so he hasn't been able to make it into PT, however he has been consistent with his HEP and feels his back pain and testicle pain has continued to improve. He even had 2 days without any testicle pain. He arrives today with minimal low back and testicle pain, already did his stretches this morning. Pt will be gone next week but will return the following weeks. Objective: See treatment diary below      Assessment: Tolerated treatment well. Patient demonstrated fatigue post treatment, exhibited good technique with therapeutic exercises, and would benefit from continued PT. Pt demonstrated good understanding of his HEP and improved abdominal activation during table exercises. Pt is still unable to perform several squats without groin pain. Plan: Continue per plan of care. Progress treatment as tolerated.        Precautions: hx of melanoma  HEP: Access Code [email or web address]  KX NEEDED  Date 12/1 11/17 11/8 11/6 10/26 10/23   Visit Number 11 10 9 8 7 6   Manuals         LAD         Hip ADD str                           Neuro Re-Ed          bridges 2x10 w/glute set and PPT 2x15 w/VC for glute set 2x10 w/VC for glute set 2x10 w/VC for glute set  2x10 w/VC for glute set   squats 10x pain     10x w/VC   Prone hip ext         glute sets    Supine 3x10 Supine 3x10    Bird dogs         TA w/pelvic tilt Posterior 2x10    Supine w/marches 2x10 BLE Posterior w/TC 3x10    Supine w/marches 2x10 BLE Posterior w/TC 3x10    Supine w/marches 2x10 B Posterior w/TC 3x10    Seated on pball TA w/march 10x B    Supine TA w/march 2x10 B Posterior w/VC & TC 4x10 Posterior w/VC & TC 3x10   Bent knee fallouts    2x10 B w/blue 2x10 B w/blue 15x B w/blue   Paloff walkout  10x each way 4x ea      Bird dog   2x10 B      Modified deadbug 10x B  15x B   2x10 B w/PPT   Ther Ex         Prone press-ups Wall lean 2x10    15x at end of session Completed at home prior to PT session Wall lean ext 3x10 Wall lean ext 3x10 Wall lean ext 2x20 Wall lean ext 3x10   SANDRA         YULIYA     3x30s B    Piriformis str         LTR         Seated flex         Seated hip ER/IR 2x10 ea 2x10 each LE  2x10 ea B     Supine log rolls (ER/IR)    2x10 ea B 10x ea B 10x ea RLE   Standing hip abd 15x B        Hip add ball squeeze     3x10 2x10   Ther Activity                           Gait Training                           Modalities

## 2023-12-11 ENCOUNTER — EVALUATION (OUTPATIENT)
Dept: PHYSICAL THERAPY | Facility: CLINIC | Age: 68
End: 2023-12-11
Payer: MEDICARE

## 2023-12-11 DIAGNOSIS — M54.16 LUMBAR RADICULOPATHY: ICD-10-CM

## 2023-12-11 DIAGNOSIS — M54.50 LOW BACK PAIN, UNSPECIFIED BACK PAIN LATERALITY, UNSPECIFIED CHRONICITY, UNSPECIFIED WHETHER SCIATICA PRESENT: Primary | ICD-10-CM

## 2023-12-11 PROCEDURE — 97110 THERAPEUTIC EXERCISES: CPT

## 2023-12-11 NOTE — PROGRESS NOTES
PT Re-Evaluation     Today's date: 2023  Patient name: Paulie Epstein  : 1955  MRN: [id number]  Referring provider: Herrera Olea MD  Dx:   Encounter Diagnosis     ICD-10-CM    1. Low back pain, unspecified back pain laterality, unspecified chronicity, unspecified whether sciatica present  M54.50       2. Lumbar radiculopathy  M54.16           Start Time: 08  Stop Time: 845  Total time in clinic (min): 40 minutes    Assessment  Assessment details: Pt is a 80 y.o male who presents to skilled physical therapy for his 12th visit with complaints of chronic low back pain that radiates into the R hip and R groin/testicle. Pt had a short hiatus from PT due to sickness. Pt demonstrated a more normalized gait pattern with less pain, fair posture, and continued restricted lumbar AROM without pain in any direction. Pt continues to respond well to extension based exercises with decrease in low back pain and release of tension in his testicle. Pt demonstrated improved squatting mechanics without increase in pain today compared to previous treatment sessions that would increase his groin pain. Pt is doing a much better job of recruiting his abdominals during his sessions, which has improved his ability to perform HEP, thus decreasing his symptoms for longer duration of time. Pt has been making progress with his lumbar ROM, pain, and overall function since he started, however deficits persist. Pt was re-educated on his diagnosis, prognosis, POC, and HEP. Pt's remaining pain and deficits are preventing him from performing self care, ADLs, recreational activities, and work duties without compensations. Pt would benefit from skilled physical therapy to reduce his pain, address his deficits, progress towards his goals, and return to his PLOF.    Impairments: abnormal gait, abnormal or restricted ROM, abnormal movement, activity intolerance, pain with function, poor posture  and poor body mechanics    Symptom irritability: moderateUnderstanding of Dx/Px/POC: good   Prognosis: good    Goals  Short Term Goals:  1) Pt will initiate and progress his HEP in 3-4 weeks. - Met  2) Pt will improve his sitting and standing posture to at least fair without pain in 3-4 weeks. - Met  3) Pt will improve his lumbar AROM to minimal restrictions with less than 2/10 pain in 3-4 weeks to improve ADLs. - Progressing    Long Term Goals:  1) Pt will be able to sit and lay down for at least 30-45 mins with less than 2/10 pain in 6-8 weeks. - Progressing  2) Pt will be able to sleep throughout the night without pain, 6-8 hours, in 6-8 weeks. - Met  3) Pt will be able to get in and out of the car without low back pain in 6-8 weeks. - Met  4) Pt will be able to perform ADLs with less than 2/10 pain in 6-8 weeks. -Progressing    Plan  Patient would benefit from: skilled physical therapy and PT eval  Planned modality interventions: cryotherapy and thermotherapy: hydrocollator packs  Planned therapy interventions: activity modification, joint mobilization, ADL retraining, manual therapy, balance, motor coordination training, neuromuscular re-education, body mechanics training, coordination, patient education, postural training, self care, strengthening, stretching, therapeutic activities, therapeutic exercise, flexibility, functional ROM exercises, gait training, graded exercise and home exercise program  Frequency: 2x week (1-2x/week)  Duration in weeks: 6  Plan of Care beginning date: 12/11/2023  Plan of Care expiration date: 1/22/2024  Treatment plan discussed with: patient        Subjective Evaluation    History of Present Illness  Mechanism of injury: 12/11/23 Update:  Pt states he had a good week last week where he had minimal to no pain. He did his exercises and they felt good, even was hunting in a tree and had minimal to no pain. However, he had a lot of pain yesterday, he cut down his [place] tree and thinks that aggravated things.  When he was in the tree stand he was in it for 2-3 hours that didn't hurt too much. Pt feels that he has made progress recently. Pt fluctuates between 50-60% better since he started. Pt still wakes up at night from pain, during the day he doesn't really notice it. Because he had the bad day yesterday he has testicle pain this morning. Patient Goals  Patient goals for therapy: decreased pain, increased motion, increased strength, independence with ADLs/IADLs and return to sport/leisure activities    Pain  Pain scale: 4-5/10 low back, 6-7/10 testicle. Pain scale at lowest: 0/10 low back, 0/10 testicle. At worst pain ratin  Location: R low back into the glute, R groin/testicle  Quality: dull ache, throbbing and tight  Relieving factors: medications (move around)  Aggravating factors: sitting, standing, lifting and stair climbing  Progression: improved    Social Support  Steps to enter house: yes  Stairs in house: yes   Lives in: multiple-level home  Lives with: spouse    Employment status: not working    Diagnostic Tests  X-ray: abnormal (degenerative changes)        Objective     Concurrent Complaints  Positive for night pain. Negative for disturbed sleep, bladder dysfunction, bowel dysfunction and saddle (S4) numbness    Static Posture     Head  Forward. Shoulders  Asymmetric shoulders, elevated and rounded.     Postural Observations  Seated posture: fair  Standing posture: fair  Correction of posture: makes symptoms better      Active Range of Motion     Lumbar   Flexion:  with pain Restriction level: moderate  Extension:  Restriction level: minimal  Left lateral flexion:  Restriction level: minimal  Right lateral flexion:  Restriction level: minimal  Left rotation:  Restriction level: minimal  Right rotation:  with pain Restriction level: minimal  Mechanical Assessment    Cervical      Thoracic      Lumbar    Standing flexion: repeated movements   Pain location:no change  Pain intensity: worse  Pain level: increased  Standing extension: repeated movements  Pain location: no change  Pain intensity: better  Pain level: decreased  Lying extension: repeated movements  Pain intensity: better  Pain level: decreased    Ambulation   Weight-Bearing Status   Weight-Bearing Status (Left): full weight bearing   Weight-Bearing Status (Right): full weight-bearing    Assistive device used: none    Observational Gait   Gait: within functional limits   Walking speed, stride length, left stance time, right stance time, left swing time, right swing time, left step length and right step length within functional limits. Left foot contact pattern: heel to toe  Right foot contact pattern: heel to toe  Left arm swing: decreased  Base of support: normal    Functional Assessment      Squat    Pain. No trunk lean left, no left tibial anterior translation beyond toes and no right tibial anterior translation beyond toes. Forward Step Up 8"   Left Leg  Valgus. Right Leg  Valgus. Forward Step Down 8"   Left Leg  Contralateral toe touch first.     Right Leg  Contralateral toe touch first.     Single Leg Stance   Left: 30 seconds  Right: 30 (increased sway) seconds                     Insurance:  AMA/CMS Eval/ Barak Husbands #/ Referral # Total units  Start date  Expiration date Extension  Visit limitation? PT only or  PT+OT?  Co-Insurance   CMS  No auth                        POC Start Date POC Expiration Date Signed POC?   12/11/23 1/22/24       Date 12/11              Visits/Units:  Used 12              Authed:  Remaining                     Precautions: hx of melanoma  HEP: Access Code [email or web address]  PK NEEDED  Date 12/11 12/1 11/17 11/8 11/6 10/26   Visit Number 12 11 10 9 8 7   Manuals         LAD         Hip ADD str                           Neuro Re-Ed          bridges  2x10 w/glute set and PPT 2x15 w/VC for glute set 2x10 w/VC for glute set 2x10 w/VC for glute set    squats  10x pain       Prone hip ext         glute sets     Supine 3x10 Supine 3x10   Bird dogs         TA w/pelvic tilt  Posterior 2x10    Supine w/marches 2x10 BLE Posterior w/TC 3x10    Supine w/marches 2x10 BLE Posterior w/TC 3x10    Supine w/marches 2x10 B Posterior w/TC 3x10    Seated on pball TA w/march 10x B    Supine TA w/march 2x10 B Posterior w/VC & TC 4x10   Bent knee fallouts     2x10 B w/blue 2x10 B w/blue   Paloff walkout   10x each way 4x ea     Bird dog    2x10 B     Modified deadbug  10x B  15x B     Ther Ex         Prone press-ups  Wall lean 2x10    15x at end of session Completed at home prior to PT session Wall lean ext 3x10 Wall lean ext 3x10 Wall lean ext 2x20   SANDRA         YULIYA      3x30s B   Piriformis str         LTR         Seated flex         Seated hip ER/IR  2x10 ea 2x10 each LE  2x10 ea B    Supine log rolls (ER/IR)     2x10 ea B 10x ea B   Standing hip abd  15x B       Hip add ball squeeze      3x10   Ther Activity                           Gait Training                           Modalities

## 2023-12-13 ENCOUNTER — APPOINTMENT (OUTPATIENT)
Dept: PHYSICAL THERAPY | Facility: CLINIC | Age: 68
End: 2023-12-13
Payer: MEDICARE

## 2023-12-18 ENCOUNTER — OFFICE VISIT (OUTPATIENT)
Dept: PHYSICAL THERAPY | Facility: CLINIC | Age: 68
End: 2023-12-18
Payer: MEDICARE

## 2023-12-18 DIAGNOSIS — M54.50 LOW BACK PAIN, UNSPECIFIED BACK PAIN LATERALITY, UNSPECIFIED CHRONICITY, UNSPECIFIED WHETHER SCIATICA PRESENT: Primary | ICD-10-CM

## 2023-12-18 DIAGNOSIS — M54.16 LUMBAR RADICULOPATHY: ICD-10-CM

## 2023-12-18 PROCEDURE — 97110 THERAPEUTIC EXERCISES: CPT

## 2023-12-18 NOTE — PROGRESS NOTES
Daily Note     Today's date: 2023  Patient name: Isauro Vasques  : 1955  MRN: [id number]  Referring provider: Danyel Najera MD  Dx:   Encounter Diagnosis     ICD-10-CM    1. Low back pain, unspecified back pain laterality, unspecified chronicity, unspecified whether sciatica present  M54.50       2. Lumbar radiculopathy  M54.16           Start Time: 0845  Stop Time: 0900  Total time in clinic (min): 15 minutes    Subjective: Pt states that his testicle pain continues and is becoming very annoying, some days he barely feels it and other days it is constant and painful. Pt has been doing his lumbar exercises consistently without any more groin relief.      Objective: See treatment diary below      Assessment: Tolerated treatment well. Patient  has not made any more progress recently with his testicular/groin pain recently, even after addressing his pelvic floor and abdominal strengthening/coordination. Pt's lumbar exercises have not resolved his pain in the testicle. At this time, educated on his pain and referred to pelvic floor PT at the [place] office for further evaluation and treatment. Treatment at this location will be put on hold for now.       Plan:  PT put on hold at this facility as he is referred out for pelvic PT.              Insurance:  AMA/CMS Eval/ Re-eval Auth #/ Referral # Total units  Start date  Expiration date Extension  Visit limitation?  PT only or  PT+OT? Co-Insurance   CMS  No auth                        POC Start Date POC Expiration Date Signed POC?   23       Date              Visits/Units:  Used 12 13             Authed:  Remaining                     Precautions: hx of melanoma  HEP: Access Code [email or web address]  KX NEEDED  Date    Visit Number 13 12 11 10 9 8   Manuals         LAD         Hip ADD str                           Neuro Re-Ed          bridges   2x10 w/glute set and PPT 2x15 w/VC for glute set 2x10 w/VC for  glute set 2x10 w/VC for glute set   squats   10x pain      Prone hip ext         glute sets      Supine 3x10   Bird dogs         TA w/pelvic tilt   Posterior 2x10    Supine w/marches 2x10 BLE Posterior w/TC 3x10    Supine w/marches 2x10 BLE Posterior w/TC 3x10    Supine w/marches 2x10 B Posterior w/TC 3x10    Seated on pball TA w/march 10x B    Supine TA w/march 2x10 B   Bent knee fallouts      2x10 B w/blue   Paloff walkout    10x each way 4x ea    Bird dog     2x10 B    Modified deadbug   10x B  15x B    Ther Ex         Prone press-ups   Wall lean 2x10    15x at end of session Completed at home prior to PT session Wall lean ext 3x10 Wall lean ext 3x10   SANDRA         YULIYA         Piriformis str         LTR         Seated flex         Seated hip ER/IR   2x10 ea 2x10 each LE  2x10 ea B   Supine log rolls (ER/IR)      2x10 ea B   Standing hip abd   15x B      Hip add ball squeeze         Ther Activity                           Gait Training                           Modalities

## 2023-12-20 ENCOUNTER — APPOINTMENT (OUTPATIENT)
Dept: PHYSICAL THERAPY | Facility: CLINIC | Age: 68
End: 2023-12-20
Payer: MEDICARE

## 2023-12-26 ENCOUNTER — OFFICE VISIT (OUTPATIENT)
Dept: PHYSICAL THERAPY | Facility: CLINIC | Age: 68
End: 2023-12-26
Payer: MEDICARE

## 2023-12-26 DIAGNOSIS — R10.2 PELVIC PAIN: Primary | ICD-10-CM

## 2023-12-26 DIAGNOSIS — N50.819 PAIN IN TESTICLE, UNSPECIFIED LATERALITY: ICD-10-CM

## 2023-12-26 DIAGNOSIS — M54.50 LOW BACK PAIN, UNSPECIFIED BACK PAIN LATERALITY, UNSPECIFIED CHRONICITY, UNSPECIFIED WHETHER SCIATICA PRESENT: ICD-10-CM

## 2023-12-26 PROCEDURE — 97112 NEUROMUSCULAR REEDUCATION: CPT

## 2023-12-26 PROCEDURE — 97162 PT EVAL MOD COMPLEX 30 MIN: CPT

## 2023-12-26 PROCEDURE — 97140 MANUAL THERAPY 1/> REGIONS: CPT

## 2023-12-26 NOTE — PROGRESS NOTES
PT Evaluation     Today's date: 2023  Patient name: Isauro Vasques  : 1955  MRN: [id number]  Referring provider: Ulises Vega  Dx:   Encounter Diagnosis     ICD-10-CM    1. Pelvic pain  R10.2       2. Pain in testicle, unspecified laterality  N50.819       3. Low back pain, unspecified back pain laterality, unspecified chronicity, unspecified whether sciatica present  M54.50                      Assessment  Assessment details:   CASE SUMMARY:   Isauro Vasques is a 68 y.o. year old male who reports onset of symptoms ~  b/l testicular and lower back pain.   Patient describes symptoms as: worrisome, painful, sleep depriving.  Symptoms are : intermittent.  Isauro is limited in the following activities: sleeping through the night, sleeping without pain, sex without pain.  Hx of b/l inguinal and umbilical hernia. Currently has bulging mid abdominally proximal to umbilicus and high resting pelvic floor tone. Tendency for breath holding with all movements.     PMHx includes: See chart for full details with medications.     Patient's clinical presentation is consistent with their referring diagnosis of: No diagnosis found..     POC was discussed and agreed upon with patient.  Patient was educated on: pelvic floor anatomy, Importance of body mechanics and ergonomics in regards to protecting against activities which increase IAP and pressure,  and PT exam and course of treatment.  Patient vocalized a good understanding of  POC and HEP issued. Patient would benefit from skilled physical therapy services to address their aforementioned functional limitations and progress towards prior level of function and independence with home exercise program.      Pelvic floor verbal consent and written consent signed and in chart- LSR 23  Patient deferred second person in room: YES         Impairments: abnormal muscle firing, abnormal muscle tone, activity intolerance, impaired physical strength, lacks appropriate home  "exercise program, poor posture  and poor body mechanics  Understanding of Dx/Px/POC: good   Prognosis: good    Goals  STG (3 weeks)  Patient will be independent with HEP  Patient will demonstrate ability to properly fill out bowel/ bladder log  Patient will self report sxs decrease by 25%  Patient will demonstrate the ability to perform kegel and downtraining    LTG (8 weeks)  Patient will be independent in comprehensive HEP  Patient will improve pelvic health metric by MDIC  Patient will self report sxs decrease by 75%  Patient will demonstrate the ability to perform sustained kegels in functional positioning     Plan  Patient would benefit from: skilled physical therapy  Planned modality interventions: biofeedback, cryotherapy, TENS, ultrasound and hydrotherapy  Planned therapy interventions: joint mobilization, manual therapy, neuromuscular re-education, patient education, postural training, strengthening, stretching, therapeutic activities, therapeutic exercise, functional ROM exercises, flexibility, graded activity, home exercise program, dry needling, abdominal trunk stabilization and breathing training  Frequency: 1x week  Duration in weeks: 8  Plan of Care beginning date: 12/26/2023  Plan of Care expiration date: 3/5/2024  Treatment plan discussed with: patient        PT Pelvic Floor Subjective:   History of Present Illness:   Reports pain started in April/ May. Has had 3 hernias.  Had lipoma taken out of the right side of back.  CT scan showed nothing. Urologist saw nothing other than a hydoseal at both testicles. Is bad sitting and when sleeping at night.  Pain is come and go.  Pain was initially right testicle and now it is both. Squats really bothered sxs. Has \"alien\" popping out of belly. Hx of 3 hernia surgeries. 2010 umbilical and bilateral inguinal hernias.        Recurrent probem    Quality of life: good    Social Support:     Lives in:  One-story house    Lives with:  Spouse    Relationship status: " "/committed    Work status: retired    Life stress level: 3    History of Depression: noPronouns: he/him  Hand dominance:  Right  Diet and Exercise:      Walks for hunting, cuts wood  Bladder Function:      Voiding Difficulties comments:     Voiding frequency: every 1-2 hours    Nocturia (episodes per night): 2 and 3    Intake (ounces): Water: 12, Coffee: 8,   Incontinence Management:     Pads/Diaper Use:  None  Bowel Function:     Bowel frequency: daily    Tift Stool Scale: type 4    Stool softener use: no stool softeners    Enema use: no enema    Uses \"squatty potty\": no Squatty Potty  Sexual Function:     Sexually Active:  Sexually activeSexual function: complete erection, able to achieve ejaculation and testicular painMostly on right, sometimes at night both  Pain ranges from 5-9/10   Helps somewhat when readjusts when sleeping but takes an hour to get back to sleep:  Patient Goals:     Patient goals for therapy:  Decreased pain, improved comfort, improved sleep, improved quality of life and improved pain management      Objective     Tests     Lumbar   Positive Valsalva.     Left Pelvic Girdle/Sacrum   Positive: active SLR test.     Additional Tests Details  Positive in supine to sit for posteriorly rotated left innominate. Responded well to anterior innominate thrust with even leg length on retest.       Abdominal Assessment:      Abdominal Assessment: DR-  Doming of abdomen approximately 7 cm proximal to umbilicus.     Hips to be assessed next session    High pelvic floor tone in puborectalis R>L    Diastatis   3\" above umbilicus (# fingers): 2  Umbilicus (# fingers): 3  3\" below umbilicus (# fingers): 0        Pelvic Floor Muscle Exam:     Muscle Contraction: well isolated   Breathing pattern with contraction: holding breath   Pelvic floor muscle relaxation is delayed.         PERFECT Score   Power right: 3+/5   Power left: 3+/5          pelvic floor exam consent given by patient    Pelvic exam " completed: rectally     SMEG Biofeedback   to be assessed next treatment             Insurance:  AMA/CMS Eval/ Re-eval POC expires [facility] 6 Auth #/ Referral # Total    Start date  Expiration date Extension  Visit limitation?  PT only or  PT+OT? Co-Insurance   CMS 12/26/23 3/5/23 27/16.67                                                                       AUTH #:  Date               Authed: Used                Remaining                   Precautions: standard      Date: 12/26/23         Session IE         Manuals          TPR Right 1/6 o'clock          Anterior innominate thrust Left performed                             Neuro Re-Ed          Neural reset          360 breathing          Diaphragmatic breathing                                                                                Ther Ex          Debra pose          Cobbler's pose          Happy baby          Chauncey pose          Lord of the half fish                                        Ther Activity                              Gait Training                              Modalities

## 2024-01-04 ENCOUNTER — OFFICE VISIT (OUTPATIENT)
Dept: PHYSICAL THERAPY | Facility: CLINIC | Age: 69
End: 2024-01-04
Payer: MEDICARE

## 2024-01-04 DIAGNOSIS — R10.2 PELVIC PAIN: Primary | ICD-10-CM

## 2024-01-04 DIAGNOSIS — M54.50 LOW BACK PAIN, UNSPECIFIED BACK PAIN LATERALITY, UNSPECIFIED CHRONICITY, UNSPECIFIED WHETHER SCIATICA PRESENT: ICD-10-CM

## 2024-01-04 DIAGNOSIS — N50.819 PAIN IN TESTICLE, UNSPECIFIED LATERALITY: ICD-10-CM

## 2024-01-04 DIAGNOSIS — M54.16 LUMBAR RADICULOPATHY: ICD-10-CM

## 2024-01-04 PROCEDURE — 97140 MANUAL THERAPY 1/> REGIONS: CPT

## 2024-01-04 PROCEDURE — 97112 NEUROMUSCULAR REEDUCATION: CPT

## 2024-01-04 NOTE — PROGRESS NOTES
Daily Note     Today's date: 2024  Patient name: Isauro Vasques  : 1955  MRN: [id number]  Referring provider: No ref. provider found  Dx:   Encounter Diagnosis     ICD-10-CM    1. Pelvic pain  R10.2       2. Pain in testicle, unspecified laterality  N50.819       3. Low back pain, unspecified back pain laterality, unspecified chronicity, unspecified whether sciatica present  M54.50       4. Lumbar radiculopathy  M54.16           Start Time: 1200  Stop Time: 1300  Total time in clinic (min): 60 minutes    Subjective: Hips are really bothering him, had difficulty sleeping and then remaining asleep due to pain in both testicles.      Objective: See treatment diary below      Assessment: Tolerated treatment well. Patient would benefit from continued PT in order to decrease b/l hip and pelvic pain. Responded well to GARETH with decreased pain upon sit to stand and ambulation. Pain in testicles decreased after b/l stomach stacking L>R. Pain in glutes decreased with SANDRA sustained.       Plan: Continue per plan of care.      Insurance:  AMA/CMS Eval/ Re-eval POC expires [facility] 6 Auth #/ Referral # Total    Start date  Expiration date Extension  Visit limitation?  PT only or  PT+OT? Co-Insurance   CMS 12/26/23 3/5/23 27/16.67                                                                       AUTH #:  Date               Authed: Used                Remaining                   Precautions: standard      Date: 23        Session IE         Manuals          TPR Right 1/6 o'clock          Anterior innominate thrust Left performed Stomach stacking b/l                            Neuro Re-Ed          Neural reset  GARETH 2*10        360 breathing  SANDRA HOB change 2x 3 min        Diaphragmatic breathing  Prone press up with therapist over pressure 8x                                                                              Ther Ex          Debra pose          Cobbler's pose          Happy baby          Lee  pose          Lord of the half fish                                        Ther Activity            Went out to patients truck to view sitting mechanics; educated on wedge cushion                  Gait Training                              Modalities

## 2024-01-08 ENCOUNTER — OFFICE VISIT (OUTPATIENT)
Dept: PAIN MEDICINE | Facility: CLINIC | Age: 69
End: 2024-01-08
Payer: MEDICARE

## 2024-01-08 VITALS
DIASTOLIC BLOOD PRESSURE: 73 MMHG | SYSTOLIC BLOOD PRESSURE: 165 MMHG | TEMPERATURE: 98.3 F | WEIGHT: 226 LBS | BODY MASS INDEX: 30.65 KG/M2 | HEART RATE: 73 BPM

## 2024-01-08 DIAGNOSIS — R10.32 BILATERAL GROIN PAIN: ICD-10-CM

## 2024-01-08 DIAGNOSIS — M54.50 LOW BACK PAIN, UNSPECIFIED BACK PAIN LATERALITY, UNSPECIFIED CHRONICITY, UNSPECIFIED WHETHER SCIATICA PRESENT: ICD-10-CM

## 2024-01-08 DIAGNOSIS — R10.31 BILATERAL GROIN PAIN: ICD-10-CM

## 2024-01-08 DIAGNOSIS — G89.4 CHRONIC PAIN SYNDROME: ICD-10-CM

## 2024-01-08 DIAGNOSIS — M54.16 LUMBAR RADICULOPATHY: Primary | ICD-10-CM

## 2024-01-08 PROCEDURE — 99214 OFFICE O/P EST MOD 30 MIN: CPT | Performed by: STUDENT IN AN ORGANIZED HEALTH CARE EDUCATION/TRAINING PROGRAM

## 2024-01-08 RX ORDER — GABAPENTIN 100 MG/1
200 CAPSULE ORAL
Qty: 60 CAPSULE | Refills: 0 | Status: SHIPPED | OUTPATIENT
Start: 2024-01-08 | End: 2024-02-07

## 2024-01-08 NOTE — PROGRESS NOTES
Pain Medicine Follow-Up Note    Assessment:  1. Lumbar radiculopathy    2. Low back pain, unspecified back pain laterality, unspecified chronicity, unspecified whether sciatica present    3. Chronic pain syndrome    4. Bilateral groin pain      Patient is a pleasant 68 year old male who returns as a follow-up visit after last being seen on 9/20/2023 for low back pain that started after an injury in May.  At that time patient did not have any radicular symptoms. X-ray of the lumbar spine from 9/20/2023 significant for grade 1 retrolisthesis from L1-L2 through L5-S1 and moderate multilevel disc base narrowing, subchondral sclerosis and endplate spondylosis and moderate lumbar facet arthropathy.  Since last visit patient symptoms are the same rating his pain as 8 out of 10 on numeric rating scale.  The pain is worse in the evening and the pain is intermittent, occurring 30-60% of the time.  The quality pain is dull aching, throbbing, pressure-like in his bilateral low back along with his groin.     On further discussion with patient he states that his low back pain has improved with physical therapy but has bilateral buttock pain and bilateral groin pain that is continue to be severe especially at night.  Given patient's continued symptoms of lumbar radiculopathy with failure of 6 weeks conservative management with physical therapy, multidisciplinary care along with medication management think is reasonable to order MRI of the lumbar spine to further evaluate for any pathology.  Pending MRI results will schedule patient for lumbar epidural steroid injection fluoroscopic guidance.  Patient counseled on the risk and benefits of the injection and elects proceed.  Additionally for symptomatic relief we will start gabapentin 200 mg nightly.  If patient continues to have bilateral groin pain after epidural therapy discussed with patient that we could consider ilioinguinal and iliohypogastric nerve block in the future.   Patient amenable to this plan. XR images independently reviewed and discussed with patient.   Plan:  Start gabapentin 200 mg nightly   MRI lumbar spine. Pending MRI findings, likely stenosis at L5-S1, compressing S1 bilaterally will schedule patient for epidural injection.   Consider Ilioinguinal, iliohypogastric nerve block for groin pain in the future.  Orders Placed This Encounter   Procedures   • MRI lumbar spine wo contrast     Standing Status:   Future     Standing Expiration Date:   1/8/2028     Scheduling Instructions:      There is no preparation for this test. Please leave your jewelry and valuables at home, wedding rings are the exception. All patients will be required to change into a hospital gown and pants.  Street clothes are not permitted in the MRI.  Magnetic nail polish must be removed prior to arrival for your test. Please bring your insurance cards, a form of photo ID and a list of your medications with you. Arrive 15 minutes prior to your appointment time in order to register. Please bring any prior CT or MRI studies of this area that were not performed at a [facility].            To schedule this appointment, please contact Central Scheduling at [phone].            Prior to your appointment, please make sure you complete the MRI Screening Form when you e-Check in for your appointment. This will be available starting 7 days before your appointment in [employer]. You may receive an e-mail with an activation code if you do not have a [employer] account. If you do not have access to a device, we will complete your screening at your appointment.     Order Specific Question:   What is the patient's sedation requirement? If Medication for Claustrophobia is selected, order medication at this point.     Answer:   No Sedation     Order Specific Question:   Does this procedure require the 3T MRI at [place] or [place]?     Answer:   No     Order Specific Question:   Release to patient through [email or web address]      Answer:   Immediate     Order Specific Question:   Is order priority selected as STAT?     Answer:   No     Order Specific Question:   Reason for Exam (FREE TEXT)     Answer:   Lumbar radiculopathy with failure of conservative management     Order Specific Question:   When should the test be performed?     Answer:   Urgent- less than one week         New Medications Ordered This Visit   Medications   • gabapentin (NEURONTIN) 100 mg capsule     Sig: Take 2 capsules (200 mg total) by mouth daily at bedtime     Dispense:  60 capsule     Refill:  0         My impressions and treatment recommendations were discussed in detail with the patient who verbalized understanding and had no further questions.      Complete risks and benefits including bleeding, infection, tissue reaction, nerve injury and allergic reaction were discussed. The approach was demonstrated using models and literature was provided. Verbal and written consent was obtained.      Follow-up is planned in four weeks time or sooner as warranted.  Discharge instructions were provided. I personally saw and examined the patient and I agree with the above discussed plan of care.    History of Present Illness:    Isauro Vasques is a 68 y.o. male who presents to [facility] for interval re-evaluation of the above stated pain complaints. The patient has a past medical and chronic pain history as outlined in the assessment section. He was last seen on 9/20/2023.    Patient is a pleasant 68-year-old male who returns as a follow-up visit after last being seen on 9/20/2023 for low back pain that started after an injury in May.  At that time patient did not have any radicular symptoms. X-ray of the lumbar spine from 9/20/2023 significant for grade 1 retrolisthesis from L1-L2 through L5-S1 and moderate multilevel disc base narrowing, subchondral sclerosis and endplate spondylosis and moderate lumbar facet arthropathy.  Since last visit patient  symptoms are the same rating his pain as 8 out of 10 on numeric rating scale.  The pain is worse in the evening and the pain is intermittent, occurring 30-60% of the time.  The quality pain is dull aching, throbbing, pressure-like in his bilateral low back along with his groin.      Other than as stated above, the patient denies any interval changes in medications, medical condition, mental condition, symptoms, or allergies since the last office visit.         Review of Systems:    Review of Systems   Constitutional:  Negative for unexpected weight change.   HENT:  Negative for ear pain.    Eyes:  Negative for visual disturbance.   Respiratory:  Negative for shortness of breath and wheezing.    Gastrointestinal:  Negative for abdominal pain.   Musculoskeletal:  Positive for gait problem. Negative for back pain.        Joint stiffness and pain in the buttocks and testicles   Neurological:  Negative for weakness and numbness.   Psychiatric/Behavioral:  Positive for sleep disturbance. Negative for decreased concentration. The patient is nervous/anxious.          Past Medical History:   Diagnosis Date   • Asthma    • Hypercholesterolemia        Past Surgical History:   Procedure Laterality Date   • BUNIONECTOMY Bilateral    • FOOT SURGERY Right    • INGUINAL HERNIA REPAIR     • SKIN CANCER EXCISION      Malignant Melanoma on back removed   • UMBILICAL HERNIA REPAIR     • VASECTOMY     • WISDOM TOOTH EXTRACTION         Family History   Problem Relation Age of Onset   • No Known Problems Mother    • No Known Problems Father        Social History     Occupational History   • Not on file   Tobacco Use   • Smoking status: Never   • Smokeless tobacco: Never   Vaping Use   • Vaping status: Never Used   Substance and Sexual Activity   • Alcohol use: Not on file   • Drug use: Never   • Sexual activity: Not on file         Current Outpatient Medications:   •  allopurinol (ZYLOPRIM) 300 mg tablet, , Disp: , Rfl:   •  aspirin  (ECOTRIN LOW STRENGTH) 81 mg EC tablet, Take by mouth daily, Disp: , Rfl:   •  atorvastatin (LIPITOR) 40 mg tablet, , Disp: , Rfl:   •  colchicine (COLCRYS) 0.6 mg tablet, , Disp: , Rfl:   •  ezetimibe (ZETIA) 10 mg tablet, , Disp: , Rfl:   •  Fluticasone-Salmeterol (ADVAIR DISKUS IN), Inhale, Disp: , Rfl:   •  gabapentin (NEURONTIN) 100 mg capsule, Take 2 capsules (200 mg total) by mouth daily at bedtime, Disp: 60 capsule, Rfl: 0  •  levocetirizine (XYZAL) 5 MG tablet, , Disp: , Rfl:   •  montelukast (SINGULAIR) 10 mg tablet, , Disp: , Rfl:   •  Omega-3 Fatty Acids (fish oil) 1,000 mg, Take by mouth daily, Disp: , Rfl:   •  Ventolin  (90 Base) MCG/ACT inhaler, if needed, Disp: , Rfl:     Allergies   Allergen Reactions   • Zithromax [Azithromycin] Rash       Physical Exam:    /73   Pulse 73   Temp 98.3 °F (36.8 °C)   Wt 103 kg (226 lb)   BMI 30.65 kg/m²     Constitutional:normal, well developed, well nourished, alert, in no distress and non-toxic and no overt pain behavior.  Eyes:anicteric  HEENT:grossly intact  Neck:supple, symmetric, trachea midline and no masses   Pulmonary:even and unlabored  Cardiovascular:No edema or pitting edema present  Skin:Normal without rashes or lesions and well hydrated  Psychiatric:Mood and affect appropriate  Neurologic:Cranial Nerves II-XII grossly intact  Musculoskeletal:antalgic gait.       Imaging  XR Lumbar spine 9/20/2023  FINDINGS:     There are 5 non rib bearing lumbar vertebral bodies.     There is no evidence of acute fracture or destructive osseous lesion.     Grade 1 retrolisthesis from L1-L2 through L5-S1.     Moderate multilevel  disc space narrowing, subchondral sclerosis, and endplate spondylosis, consistent with degenerative disc disease. Moderate lower lumbar facet arthropathy.        The pedicles appear intact.     Soft tissues are unremarkable.     IMPRESSION:     No acute osseous abnormality.     Degenerative changes as described.  MRI lumbar  spine wo contrast    (Results Pending)         Orders Placed This Encounter   Procedures   • MRI lumbar spine wo contrast

## 2024-01-16 ENCOUNTER — TELEPHONE (OUTPATIENT)
Age: 69
End: 2024-01-16

## 2024-01-16 ENCOUNTER — TELEPHONE (OUTPATIENT)
Dept: PAIN MEDICINE | Facility: CLINIC | Age: 69
End: 2024-01-16

## 2024-01-16 DIAGNOSIS — M54.16 LUMBAR RADICULOPATHY: Primary | ICD-10-CM

## 2024-01-16 NOTE — TELEPHONE ENCOUNTER
Caller: Isauro    Doctor: Reinaldo    Reason for call: patient returning nurses phone call    Call back#: [phone]

## 2024-01-16 NOTE — TELEPHONE ENCOUNTER
----- Message from Danyel Najera MD sent at 1/16/2024 10:30 AM EST -----  I made a referral to Neurosurgery for further evaluation. Patient with severe and moderate to severe disease at multiple levels with compression of the thecal sac. Would want him evaluated prior to injection therapy.   Best,   Danyel Najera MD

## 2024-01-16 NOTE — TELEPHONE ENCOUNTER
Caller: [facility]    Doctor: Reinaldo    Reason for call: Confirm we received radiology results     Call back#: n/a

## 2024-01-16 NOTE — TELEPHONE ENCOUNTER
Caller: Isauro    Doctor:     Reason for call: returning nurses phone call, I made him aware of message below referral placed to neurosurgery     I provided [phone]  He also would like copy of MRI thank you    Call back#: [phone]

## 2024-01-16 NOTE — TELEPHONE ENCOUNTER
S/W pt.  Advised pt of the same.  Advised neurosurgery can see the MRI results in the computer system.  Pt stated he does not need a copy.  Pt verbalized understanding.

## 2024-01-18 ENCOUNTER — OFFICE VISIT (OUTPATIENT)
Dept: PHYSICAL THERAPY | Facility: CLINIC | Age: 69
End: 2024-01-18
Payer: MEDICARE

## 2024-01-18 DIAGNOSIS — M54.50 CHRONIC BILATERAL LOW BACK PAIN WITHOUT SCIATICA: Primary | ICD-10-CM

## 2024-01-18 DIAGNOSIS — N50.819 PAIN IN TESTICLE, UNSPECIFIED LATERALITY: ICD-10-CM

## 2024-01-18 DIAGNOSIS — M54.16 LUMBAR RADICULOPATHY: ICD-10-CM

## 2024-01-18 DIAGNOSIS — G89.29 CHRONIC BILATERAL LOW BACK PAIN WITHOUT SCIATICA: Primary | ICD-10-CM

## 2024-01-18 DIAGNOSIS — R10.2 PELVIC PAIN: Primary | ICD-10-CM

## 2024-01-18 DIAGNOSIS — M54.50 LOW BACK PAIN, UNSPECIFIED BACK PAIN LATERALITY, UNSPECIFIED CHRONICITY, UNSPECIFIED WHETHER SCIATICA PRESENT: ICD-10-CM

## 2024-01-18 PROCEDURE — 97112 NEUROMUSCULAR REEDUCATION: CPT

## 2024-01-18 PROCEDURE — 97140 MANUAL THERAPY 1/> REGIONS: CPT

## 2024-01-18 RX ORDER — CYCLOBENZAPRINE HCL 5 MG
5 TABLET ORAL 3 TIMES DAILY PRN
Qty: 90 TABLET | Refills: 0 | Status: SHIPPED | OUTPATIENT
Start: 2024-01-18 | End: 2024-02-17

## 2024-01-18 NOTE — PROGRESS NOTES
Daily Note     Today's date: 2024  Patient name: Isauro Vasques  : 1955  MRN: [id number]  Referring provider: No ref. provider found  Dx:   Encounter Diagnosis     ICD-10-CM    1. Pelvic pain  R10.2       2. Pain in testicle, unspecified laterality  N50.819       3. Low back pain, unspecified back pain laterality, unspecified chronicity, unspecified whether sciatica present  M54.50       4. Lumbar radiculopathy  M54.16                      Subjective: Reports that after last session he felt pretty good for a day and a half and was able to sleep through the night.  Has had MRI (see objective) and is scheduled to see neurosurgeon. Pain in testicles continues to shift from right to left.  Today it is on left.       Objective: See treatment diary below      Assessment: Tolerated treatment well. Patient would benefit from continued PT in order to manage pelvic pain sxs. Patient reported 6/10 pain b/l testicles before session and had no pain in right testicle after session with a decrease in back pain after press ups and side glides. There is little carry over after a day and a half.  Significant MRI findings as noted above. Discussed internal release work near Alcock's canal for pudendal nerve glides next session.       Plan: Continue per plan of care.      Insurance:  AMA/CMS Eval/ Re-eval POC expires [facility] Auth #/ Referral # Total    Start date  Expiration date Extension  Visit limitation?  PT only or  PT+OT? Co-Insurance   CMS 12/26/23 3/5/23 27/16.67                                                                       AUTH #:  Date               Authed: Used                Remaining                   Precautions: standard      Date: 23       Session IE         Manuals          TPR Right 1/6 o'clock          Anterior innominate thrust Left performed Stomach stacking b/l Stomach stacking b/l          B/l inguinal stacking                 Neuro Re-Ed          Neural reset  GARETH 2*10         360 breathing  SANDRA HOB change 2x 3 min SANDRA HOB change 2x 3 min with HP       Diaphragmatic breathing  Prone press up with therapist over pressure 8x Prone press up with therapist over pressure 8x                    Side glides left, 10x                                                          Ther Ex          Debra pose          Cobbler's pose          Happy baby          Calvin pose          Lord of the half fish                                        Ther Activity            Went out to patients truck to view sitting mechanics; educated on wedge cushion                  Gait Training                              Modalities

## 2024-01-24 PROBLEM — M54.50 LOWER BACK PAIN: Status: ACTIVE | Noted: 2024-01-24

## 2024-01-24 NOTE — ASSESSMENT & PLAN NOTE
Presents as referral from pain management for evaluation of lumbar radiculopathy/stenosis  Pain to low back radiating to testicles x 1 year.  PT since October, 2024.  Follows with Dr. Najera (PM).  Started on gabapentin 200 mg nightly and flexeril with significant improvement in pain.  Exam is non-focal. No claudication or motor weakness.    Imaging:  MRI lumbar spine 1/18/2024: Multilevel degenerative disease of the lumbar spine superimposed on congenitally narrowed spinal canal. There is resulting multilevel up to severe spinal canal, severe lateral recess and severe neural foramina narrowing. Possible mass effect on the cauda equina nerve roots at multiple levels and on the exiting L4 nerve in the left L4-5 neural foramina. Extensively heterogenous marrow signal with extensive patchy areas of T1 hypointensity throughout visualized osseous structures, indeterminate. Metastatic disease in this patient with history of malignancy is not excluded.    Plan:  Reviewed imaging extensively with patient.  Discussed natural history of degenerative disease and lumbar stenosis.  Reviewed that lumbar pain typically does not respond well to decompressive surgery.  Additionally, testicular pain would be highly unusual as a result of degenerative disease to diseased areas on his MRI.  Concern for alternate etiology of testicular pain in setting of three prior inguinal hernia repairs with mesh.  Advised to return with any symptoms of neurogenic claudication or numbness/weakness.  Continue gabapentin and Flexeril as prescribed.   Follow up PRN.

## 2024-01-25 ENCOUNTER — APPOINTMENT (OUTPATIENT)
Dept: PHYSICAL THERAPY | Facility: CLINIC | Age: 69
End: 2024-01-25
Payer: MEDICARE

## 2024-01-26 ENCOUNTER — OFFICE VISIT (OUTPATIENT)
Dept: NEUROSURGERY | Facility: CLINIC | Age: 69
End: 2024-01-26
Payer: MEDICARE

## 2024-01-26 VITALS
RESPIRATION RATE: 16 BRPM | HEART RATE: 83 BPM | WEIGHT: 226 LBS | TEMPERATURE: 98.3 F | DIASTOLIC BLOOD PRESSURE: 86 MMHG | HEIGHT: 72 IN | OXYGEN SATURATION: 97 % | BODY MASS INDEX: 30.61 KG/M2 | SYSTOLIC BLOOD PRESSURE: 128 MMHG

## 2024-01-26 DIAGNOSIS — M54.16 LUMBAR RADICULOPATHY: ICD-10-CM

## 2024-01-26 DIAGNOSIS — M54.50 LOWER BACK PAIN: Primary | ICD-10-CM

## 2024-01-26 PROBLEM — M48.061 LUMBAR STENOSIS: Status: ACTIVE | Noted: 2024-01-26

## 2024-01-26 PROCEDURE — 99204 OFFICE O/P NEW MOD 45 MIN: CPT | Performed by: NURSE PRACTITIONER

## 2024-01-26 RX ORDER — IBUPROFEN 200 MG
TABLET ORAL EVERY 6 HOURS PRN
COMMUNITY

## 2024-01-26 NOTE — PROGRESS NOTES
Neurosurgery Office Note  Isauro Vasques 68 y.o. male MRN: [id number]      Assessment/Plan     Lower back pain  Presents as referral from pain management for evaluation of lumbar radiculopathy/stenosis  Pain to low back radiating to testicles x 1 year.  PT since October, 2024.  Follows with Dr. Najera (PM).  Started on gabapentin 200 mg nightly and flexeril with significant improvement in pain.  Exam is non-focal. No claudication or motor weakness.    Imaging:  MRI lumbar spine 1/18/2024: Multilevel degenerative disease of the lumbar spine superimposed on congenitally narrowed spinal canal. There is resulting multilevel up to severe spinal canal, severe lateral recess and severe neural foramina narrowing. Possible mass effect on the cauda equina nerve roots at multiple levels and on the exiting L4 nerve in the left L4-5 neural foramina. Extensively heterogenous marrow signal with extensive patchy areas of T1 hypointensity throughout visualized osseous structures, indeterminate. Metastatic disease in this patient with history of malignancy is not excluded.    Plan:  Reviewed imaging extensively with patient.  Discussed natural history of degenerative disease and lumbar stenosis.  Reviewed that lumbar pain typically does not respond well to decompressive surgery.  Additionally, testicular pain would be highly unusual as a result of degenerative disease to diseased areas on his MRI.  Concern for alternate etiology of testicular pain in setting of three prior inguinal hernia repairs with mesh.  Advised to return with any symptoms of neurogenic claudication or numbness/weakness.  Continue gabapentin and Flexeril as prescribed.   Follow up PRN.    Lumbar stenosis  See above.     Diagnoses and all orders for this visit:    Lower back pain    Lumbar radiculopathy  -     Ambulatory referral to Neurosurgery    Other orders  -     ibuprofen (MOTRIN) 200 mg tablet; Take by mouth every 6 (six) hours as needed for mild pain           I have spent a total time of 40 minutes on 01/26/24 in caring for this patient including Diagnostic results, Risks and benefits of tx options, Instructions for management, Patient and family education, Impressions, Counseling / Coordination of care, Documenting in the medical record, Reviewing / ordering tests, medicine, procedures  , and Obtaining or reviewing history  .      CHIEF COMPLAINT    Chief Complaint   Patient presents with    Consult     LBP        HISTORY    History of Present Illness     68 y.o. year old male     With past medical history of asthma, hyperlipidemia, on aspirin, gout, who presents as referral from pain management physician for evaluation of lumbar back pain radiating into the testicles.  He relates that symptoms have been ongoing for the past year.  He noticed the pain to his testicles most often at night when he was trying to sleep or laying on his side.  Right side was always worse.  He underwent evaluation by urology as well as general surgeon and they felt that symptoms may be coming from degenerative disease in his back.  He began physical therapy which she is still doing.  He has also been doing pelvic floor therapy.  He feels that this helped somewhat but relief was mild.  Most recently about 3 weeks ago he began taking gabapentin 200 mg at night as well as Flexeril and feels that his symptoms have improved significantly.  He is now able to sleep at night without discomfort.    He describes that the pain in his testicles felt as though someone was constantly kicking him.  He complains of low back pain on occasion particularly when he is very active.  He denies any bowel or bladder issues.  He denies any numbness or weakness.  He has no issues ambulating and remains very active with chores such as chopping wood.  He is retired from working in [employer].  He lives at home with his wife.        See Discussion    REVIEW OF SYSTEMS    Review of Systems   Musculoskeletal:   Positive for back pain and gait problem.        No previous sx   Pt c/o LBP pain Testicles and trouble sleeping ( some b/l buttock pain at night)  w gait problem w long distances x 1 yr    Pt c/o more back soreness- states Flexeril seems to be helping somewhat     Meds Gabapentin, flexeril     Physical therapy last ov 1/18/24 ( not helping)   PM last ov 1/8/24  No recent QUINN    Psychiatric/Behavioral:  Positive for sleep disturbance.    All other systems reviewed and are negative.      ROS obtained by MA. Reviewed. See HPI.     Meds/Allergies     Current Outpatient Medications   Medication Sig Dispense Refill    allopurinol (ZYLOPRIM) 300 mg tablet       aspirin (ECOTRIN LOW STRENGTH) 81 mg EC tablet Take by mouth daily      atorvastatin (LIPITOR) 40 mg tablet       colchicine (COLCRYS) 0.6 mg tablet if needed      cyclobenzaprine (FLEXERIL) 5 mg tablet Take 1 tablet (5 mg total) by mouth 3 (three) times a day as needed for muscle spasms 90 tablet 0    ezetimibe (ZETIA) 10 mg tablet       Fluticasone-Salmeterol (ADVAIR DISKUS IN) Inhale      gabapentin (NEURONTIN) 100 mg capsule Take 2 capsules (200 mg total) by mouth daily at bedtime 60 capsule 0    ibuprofen (MOTRIN) 200 mg tablet Take by mouth every 6 (six) hours as needed for mild pain      levocetirizine (XYZAL) 5 MG tablet       montelukast (SINGULAIR) 10 mg tablet       Omega-3 Fatty Acids (fish oil) 1,000 mg Take by mouth daily      Ventolin  (90 Base) MCG/ACT inhaler if needed       No current facility-administered medications for this visit.       Allergies   Allergen Reactions    Zithromax [Azithromycin] Rash       PAST HISTORY    Past Medical History:   Diagnosis Date    Asthma     Hypercholesterolemia        Past Surgical History:   Procedure Laterality Date    BUNIONECTOMY Bilateral     FOOT SURGERY Right     INGUINAL HERNIA REPAIR      SKIN CANCER EXCISION      Malignant Melanoma on back removed    UMBILICAL HERNIA REPAIR      VASECTOMY       WISDOM TOOTH EXTRACTION         Social History     Tobacco Use    Smoking status: Never    Smokeless tobacco: Never   Vaping Use    Vaping status: Never Used   Substance Use Topics    Alcohol use: Yes     Comment: Social-Beer    Drug use: Never       Family History   Problem Relation Age of Onset    No Known Problems Mother     No Known Problems Father          Above history personally reviewed.       EXAM    Vitals:Blood pressure 128/86, pulse 83, temperature 98.3 °F (36.8 °C), temperature source Temporal, resp. rate 16, height 6' (1.829 m), weight 103 kg (226 lb), SpO2 97%.,Body mass index is 30.65 kg/m².     Physical Exam  Constitutional:       General: He is not in acute distress.     Appearance: He is well-developed. He is not diaphoretic.   Eyes:      General:         Right eye: No discharge.         Left eye: No discharge.      Extraocular Movements: EOM normal.      Conjunctiva/sclera: Conjunctivae normal.      Pupils: Pupils are equal, round, and reactive to light.   Pulmonary:      Effort: Pulmonary effort is normal. No respiratory distress.   Abdominal:      General: There is no distension.      Tenderness: There is no abdominal tenderness.   Musculoskeletal:         General: Normal range of motion.      Cervical back: Normal range of motion and neck supple.   Skin:     General: Skin is warm and dry.   Neurological:      Mental Status: He is alert and oriented to person, place, and time.      Cranial Nerves: No cranial nerve deficit.      Sensory: No sensory deficit.      Motor: No weakness.      Coordination: Coordination normal. Finger-Nose-Finger Test normal.      Deep Tendon Reflexes: Reflexes normal.      Reflex Scores:       Patellar reflexes are 2+ on the right side and 2+ on the left side.       Achilles reflexes are 2+ on the right side and 2+ on the left side.  Psychiatric:         Speech: Speech normal.         Behavior: Behavior normal.         Thought Content: Thought content normal.          Judgment: Judgment normal.         Neurologic Exam     Mental Status   Oriented to person, place, and time.   Oriented to person.   Oriented to place.   Oriented to time. Oriented to year, month and date.   Attention: normal. Concentration: normal.   Speech: speech is normal   Level of consciousness: alert    Cranial Nerves     CN III, IV, VI   Pupils are equal, round, and reactive to light.  Extraocular motions are normal.   Right pupil: Size: 3 mm. Shape: regular. Reactivity: brisk. Consensual response: intact. Accommodation: intact.   Left pupil: Size: 3 mm. Shape: regular. Reactivity: brisk. Consensual response: intact. Accommodation: intact.   Nystagmus: none   Diplopia: none  Conjugate gaze: present    CN V   Right facial sensation deficit: none  Left facial sensation deficit: none    CN VII   Facial expression full, symmetric.     CN VIII   Hearing: intact    CN IX, X   Palate: symmetric    CN XI   Right sternocleidomastoid strength: normal  Left sternocleidomastoid strength: normal  Right trapezius strength: normal  Left trapezius strength: normal    CN XII   Tongue: not atrophic  Fasciculations: absent  Tongue deviation: none    Motor Exam   Muscle bulk: normal  Overall muscle tone: normal  Right arm pronator drift: absent  Left arm pronator drift: absent    Strength   Right deltoid: 5/5  Left deltoid: 5/5  Right biceps: 5/5  Left biceps: 5/5  Right triceps: 5/5  Left triceps: 5/5  Right quadriceps: 5/5  Left quadriceps: 5/5  Right hamstrin/5  Left hamstrin/5  Right anterior tibial: 5/5  Left anterior tibial: 5/5  Right posterior tibial: 5/5  Left posterior tibial: 5/5  Right peroneal: 5/5  Left peroneal: 5/5  Right gastroc: 5/5  Left gastroc: 5/5    Sensory Exam   Light touch normal.   Proprioception normal.     Gait, Coordination, and Reflexes     Coordination   Finger to nose coordination: normal    Tremor   Resting tremor: absent  Intention tremor: absent  Action tremor: absent    Reflexes    Right patellar: 2+  Left patellar: 2+  Right achilles: 2+  Left achilles: 2+        MEDICAL DECISION MAKING    Imaging Studies:     MRI lumbar spine wo contrast    Result Date: 1/18/2024  Narrative: [id number]      I have personally reviewed pertinent reports.   and I have personally reviewed pertinent films in PACS

## 2024-01-29 ENCOUNTER — OFFICE VISIT (OUTPATIENT)
Dept: PAIN MEDICINE | Facility: CLINIC | Age: 69
End: 2024-01-29
Payer: MEDICARE

## 2024-01-29 VITALS
BODY MASS INDEX: 31.33 KG/M2 | WEIGHT: 231 LBS | HEART RATE: 86 BPM | SYSTOLIC BLOOD PRESSURE: 145 MMHG | DIASTOLIC BLOOD PRESSURE: 78 MMHG

## 2024-01-29 DIAGNOSIS — M54.50 LOW BACK PAIN, UNSPECIFIED BACK PAIN LATERALITY, UNSPECIFIED CHRONICITY, UNSPECIFIED WHETHER SCIATICA PRESENT: ICD-10-CM

## 2024-01-29 DIAGNOSIS — G89.29 CHRONIC BILATERAL LOW BACK PAIN WITHOUT SCIATICA: ICD-10-CM

## 2024-01-29 DIAGNOSIS — M48.061 SPINAL STENOSIS OF LUMBAR REGION WITHOUT NEUROGENIC CLAUDICATION: ICD-10-CM

## 2024-01-29 DIAGNOSIS — R10.30 INGUINAL PAIN, UNSPECIFIED LATERALITY: ICD-10-CM

## 2024-01-29 DIAGNOSIS — M54.16 LUMBAR RADICULOPATHY: Primary | ICD-10-CM

## 2024-01-29 DIAGNOSIS — M54.50 CHRONIC BILATERAL LOW BACK PAIN WITHOUT SCIATICA: ICD-10-CM

## 2024-01-29 DIAGNOSIS — G89.4 CHRONIC PAIN SYNDROME: ICD-10-CM

## 2024-01-29 PROCEDURE — 99214 OFFICE O/P EST MOD 30 MIN: CPT | Performed by: STUDENT IN AN ORGANIZED HEALTH CARE EDUCATION/TRAINING PROGRAM

## 2024-01-29 RX ORDER — CYCLOBENZAPRINE HCL 5 MG
5 TABLET ORAL 2 TIMES DAILY PRN
Qty: 60 TABLET | Refills: 5 | Status: SHIPPED | OUTPATIENT
Start: 2024-01-29 | End: 2024-07-27

## 2024-01-29 RX ORDER — GABAPENTIN 100 MG/1
200 CAPSULE ORAL
Qty: 60 CAPSULE | Refills: 5 | Status: SHIPPED | OUTPATIENT
Start: 2024-01-29 | End: 2024-07-27

## 2024-01-29 NOTE — PROGRESS NOTES
Pain Medicine Follow-Up Note    Assessment:  1. Lumbar radiculopathy    2. Chronic pain syndrome    3. Spinal stenosis of lumbar region without neurogenic claudication    4. Inguinal pain, unspecified laterality    5. Low back pain, unspecified back pain laterality, unspecified chronicity, unspecified whether sciatica present    6. Chronic bilateral low back pain without sciatica      Patient is a pleasant 68-year-old male who returns as a follow-up visit after last being seen on 1/8/2024 for symptoms of bilateral groin pain and lumbar radiculopathy.  At last visit patient was started on gabapentin 200 mg nightly an MRI of the lumbar spine was ordered.  MRI of the lumbar spine significant for multilevel severe spinal canal stenosis with severe lateral recess and severe neuroforaminal narrowing at multiple levels with possible mass effect at L4-L5.  Patient also with heterogeneous marrow signal with extensive patchy areas of T1 hypointensity.  Patient seen by neurosurgery on 1/26/2024 who recommended continuation of his current care given patient is doing well with medication management.  Today patient reports that his symptoms are better since last visit rating his pain as 8 out of 10 on numeric rating scale.  Pain is worse in the evening and the pain is constant, occurring 100% of the time.  The quality pain is dull aching, pressure-like primarily in his axial lower back.    On exam patient with tenderness to palpation bilateral lumbar paraspinal muscles and also positive lumbar facet loading bilaterally.  Patient reports that his groin pain has gone away and that he just has some soreness in his lower back now.  Patient said to continue with physical therapy and medication management.  Given patient is stable on his current medications patient to follow-up in 6 months for further consideration of medication management.  Patient is not interested in injection therapy.    Plan:  Continue gabapentin 200 mg nightly    Continue flexeril 5 mg BID prn   Continue PT and HEP  Follow up in six months  No orders of the defined types were placed in this encounter.      New Medications Ordered This Visit   Medications   • gabapentin (NEURONTIN) 100 mg capsule     Sig: Take 2 capsules (200 mg total) by mouth daily at bedtime     Dispense:  60 capsule     Refill:  5   • cyclobenzaprine (FLEXERIL) 5 mg tablet     Sig: Take 1 tablet (5 mg total) by mouth 2 (two) times a day as needed for muscle spasms     Dispense:  60 tablet     Refill:  5       My impressions and treatment recommendations were discussed in detail with the patient who verbalized understanding and had no further questions.      Follow-up is planned in six months time or sooner as warranted.  Discharge instructions were provided. I personally saw and examined the patient and I agree with the above discussed plan of care.    History of Present Illness:    Isauro Vasques is a 68 y.o. male who presents to [facility] for interval re-evaluation of the above stated pain complaints. The patient has a past medical and chronic pain history as outlined in the assessment section. He was last seen on 1/8/2024.    Patient is a pleasant 68-year-old male who returns as a follow-up visit after last being seen on 1/8/2024 for symptoms of bilateral groin pain and lumbar radiculopathy.  At last visit patient was started on gabapentin 200 mg nightly an MRI of the lumbar spine was ordered.  MRI of the lumbar spine significant for multilevel severe spinal canal stenosis with severe lateral recess and severe neuroforaminal narrowing at multiple levels with possible mass effect at L4-L5.  Patient also with heterogeneous marrow signal with extensive patchy areas of T1 hypointensity.  Patient seen by neurosurgery on 1/26/2024 who recommended continuation of his current care given patient is doing well with medication management.  Today patient reports that his symptoms are  better since last visit rating his pain as 8 out of 10 on numeric rating scale.  Pain is worse in the evening and the pain is constant, occurring 100% of the time.  The quality pain is dull aching, pressure-like primarily in his axial lower back.      Other than as stated above, the patient denies any interval changes in medications, medical condition, mental condition, symptoms, or allergies since the last office visit.         Review of Systems:    Review of Systems   Constitutional:  Negative for unexpected weight change.   HENT:  Negative for ear pain.    Eyes:  Negative for visual disturbance.   Respiratory:  Negative for shortness of breath and wheezing.    Gastrointestinal:  Negative for abdominal pain.   Musculoskeletal:  Positive for back pain and gait problem.        Back pain only   Neurological:  Negative for weakness and numbness.   Psychiatric/Behavioral:  Negative for decreased concentration.          Past Medical History:   Diagnosis Date   • Asthma    • Hypercholesterolemia        Past Surgical History:   Procedure Laterality Date   • BUNIONECTOMY Bilateral    • FOOT SURGERY Right    • INGUINAL HERNIA REPAIR     • SKIN CANCER EXCISION      Malignant Melanoma on back removed   • UMBILICAL HERNIA REPAIR     • VASECTOMY     • WISDOM TOOTH EXTRACTION         Family History   Problem Relation Age of Onset   • No Known Problems Mother    • No Known Problems Father        Social History     Occupational History   • Not on file   Tobacco Use   • Smoking status: Never   • Smokeless tobacco: Never   Vaping Use   • Vaping status: Never Used   Substance and Sexual Activity   • Alcohol use: Yes     Comment: Social-Beer   • Drug use: Never   • Sexual activity: Not on file         Current Outpatient Medications:   •  allopurinol (ZYLOPRIM) 300 mg tablet, , Disp: , Rfl:   •  aspirin (ECOTRIN LOW STRENGTH) 81 mg EC tablet, Take by mouth daily, Disp: , Rfl:   •  atorvastatin (LIPITOR) 40 mg tablet, , Disp: , Rfl:   •   colchicine (COLCRYS) 0.6 mg tablet, if needed, Disp: , Rfl:   •  cyclobenzaprine (FLEXERIL) 5 mg tablet, Take 1 tablet (5 mg total) by mouth 2 (two) times a day as needed for muscle spasms, Disp: 60 tablet, Rfl: 5  •  ezetimibe (ZETIA) 10 mg tablet, , Disp: , Rfl:   •  Fluticasone-Salmeterol (ADVAIR DISKUS IN), Inhale, Disp: , Rfl:   •  gabapentin (NEURONTIN) 100 mg capsule, Take 2 capsules (200 mg total) by mouth daily at bedtime, Disp: 60 capsule, Rfl: 5  •  levocetirizine (XYZAL) 5 MG tablet, , Disp: , Rfl:   •  montelukast (SINGULAIR) 10 mg tablet, , Disp: , Rfl:   •  Omega-3 Fatty Acids (fish oil) 1,000 mg, Take by mouth daily, Disp: , Rfl:   •  Ventolin  (90 Base) MCG/ACT inhaler, if needed, Disp: , Rfl:   •  ibuprofen (MOTRIN) 200 mg tablet, Take by mouth every 6 (six) hours as needed for mild pain (Patient not taking: Reported on 1/29/2024), Disp: , Rfl:     Allergies   Allergen Reactions   • Zithromax [Azithromycin] Rash       Physical Exam:    /78   Pulse 86   Wt 105 kg (231 lb)   BMI 31.33 kg/m²     Constitutional:normal, well developed, well nourished, alert, in no distress and non-toxic and no overt pain behavior.  Eyes:anicteric  HEENT:grossly intact  Neck:supple, symmetric, trachea midline and no masses   Pulmonary:even and unlabored  Cardiovascular:No edema or pitting edema present  Skin:Normal without rashes or lesions and well hydrated  Psychiatric:Mood and affect appropriate  Neurologic:Cranial Nerves II-XII grossly intact  Musculoskeletal:antalgic gait. TTP in bilateral lumbar paraspinal muscles. Positive lumbar facet loading bilaterally.       Imaging  MRI Lumbar spine 1/16/2024     Media Information      Document Information    Radiology Results Ext   Radiology   01/16/2024 11:46 AM   Attached To:   EXTERNAL IMAGING [[id number]]   Scanned Document on 1/16/24 with Danyel Najera MD   Source Information    Danyel Najera MD     No orders to display         No orders of the  defined types were placed in this encounter.

## 2024-02-01 ENCOUNTER — OFFICE VISIT (OUTPATIENT)
Dept: PHYSICAL THERAPY | Facility: CLINIC | Age: 69
End: 2024-02-01
Payer: MEDICARE

## 2024-02-01 DIAGNOSIS — N50.819 PAIN IN TESTICLE, UNSPECIFIED LATERALITY: ICD-10-CM

## 2024-02-01 DIAGNOSIS — M54.16 LUMBAR RADICULOPATHY: ICD-10-CM

## 2024-02-01 DIAGNOSIS — R10.2 PELVIC PAIN: Primary | ICD-10-CM

## 2024-02-01 DIAGNOSIS — M54.50 LOW BACK PAIN, UNSPECIFIED BACK PAIN LATERALITY, UNSPECIFIED CHRONICITY, UNSPECIFIED WHETHER SCIATICA PRESENT: ICD-10-CM

## 2024-02-01 PROCEDURE — 97140 MANUAL THERAPY 1/> REGIONS: CPT

## 2024-02-01 PROCEDURE — 97112 NEUROMUSCULAR REEDUCATION: CPT

## 2024-02-01 NOTE — PROGRESS NOTES
Daily Note     Today's date: 2024  Patient name: Isauro Vasques  : 1955  MRN: [id number]  Referring provider: Ulises Vega  Dx:   Encounter Diagnosis     ICD-10-CM    1. Pelvic pain  R10.2       2. Low back pain, unspecified back pain laterality, unspecified chronicity, unspecified whether sciatica present  M54.50       3. Pain in testicle, unspecified laterality  N50.819       4. Lumbar radiculopathy  M54.16           Start Time: 0900  Stop Time: 1000  Total time in clinic (min): 60 minutes    Subjective: Fell yesterday down two steps landing on right hip. Is stiff.  Gabapentin seems to be kicking in and the pain in his testicles is greatly decreased. MD thinks that pain is likely due to prior inguinal surgeries      Objective: See treatment diary below      Assessment: Tolerated treatment well. Patient would benefit from continued PT in order to build core and postural musculature to support lumbar spine. Did well with the addition of core and glute strengthening. Updated HEP and given band to continue exercises at home. No increase in back or testicle pain during session.       Plan: Continue per plan of care.      Insurance:  AMA/CMS Eval/ Re-eval POC expires [facility] Auth #/ Referral # Total    Start date  Expiration date Extension  Visit limitation?  PT only or  PT+OT? Co-Insurance   CMS 12/26/23 3/5/23 27/16.67                                                                       AUTH #:  Date               Authed: Used                Remaining                   Precautions: standard      Date: 23      Session IE   4      Manuals          TPR Right 1/6 o'clock          Anterior innominate thrust Left performed Stomach stacking b/l Stomach stacking b/l Stomach stacking b/l         B/l inguinal stacking B/l inguinal stacking                Neuro Re-Ed          Neural reset  GARETH 2*10        360 breathing  SANDRA HOB change 2x 3 min SANDRA HOB change 2x 3 min with HP        Diaphragmatic breathing  Prone press up with therapist over pressure 8x Prone press up with therapist over pressure 8x TA engagement  TA + SLR 20x b/l          Clamshell 20x b/l btb         Side glides left, 10x                                                          Ther Ex          Debra pose          Cobbler's pose          Happy baby          Herndon pose          Lord of the half fish                                        Ther Activity            Went out to patients truck to view sitting mechanics; educated on wedge cushion                  Gait Training                              Modalities                                Access Code: [email or web address]  URL: [email or web address]  Date: 02/01/2024  Prepared by: Yuki Marie-Dimitris    Exercises  - Supine Bridge  - 1 x daily - 7 x weekly - 3 sets - 10 reps  - Clamshell with Resistance  - 1 x daily - 7 x weekly - 3 sets - 10 reps  - Supine Active Straight Leg Raise  - 1 x daily - 7 x weekly - 3 sets - 10 reps  - Hip Abduction with Resistance Loop  - 1 x daily - 7 x weekly - 3 sets - 10 reps  - Standing Hip Extension Kicks  - 1 x daily - 7 x weekly - 3 sets - 10 reps  - Side Stepping with Resistance at Feet  - 1 x daily - 7 x weekly - 3 sets - 10 reps

## 2024-02-08 ENCOUNTER — OFFICE VISIT (OUTPATIENT)
Dept: PHYSICAL THERAPY | Facility: CLINIC | Age: 69
End: 2024-02-08
Payer: MEDICARE

## 2024-02-08 DIAGNOSIS — M54.16 LUMBAR RADICULOPATHY: ICD-10-CM

## 2024-02-08 DIAGNOSIS — M54.50 LOW BACK PAIN, UNSPECIFIED BACK PAIN LATERALITY, UNSPECIFIED CHRONICITY, UNSPECIFIED WHETHER SCIATICA PRESENT: ICD-10-CM

## 2024-02-08 DIAGNOSIS — R10.2 PELVIC PAIN: Primary | ICD-10-CM

## 2024-02-08 DIAGNOSIS — N50.819 PAIN IN TESTICLE, UNSPECIFIED LATERALITY: ICD-10-CM

## 2024-02-08 PROCEDURE — 97112 NEUROMUSCULAR REEDUCATION: CPT

## 2024-02-08 PROCEDURE — 97140 MANUAL THERAPY 1/> REGIONS: CPT
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
## 2024-02-15 ENCOUNTER — APPOINTMENT (OUTPATIENT)
Dept: PHYSICAL THERAPY | Facility: CLINIC | Age: 69
End: 2024-02-15
Payer: MEDICARE

## 2024-02-22 ENCOUNTER — APPOINTMENT (OUTPATIENT)
Dept: PHYSICAL THERAPY | Facility: CLINIC | Age: 69
End: 2024-02-22
Payer: MEDICARE

## 2024-02-29 ENCOUNTER — APPOINTMENT (OUTPATIENT)
Dept: PHYSICAL THERAPY | Facility: CLINIC | Age: 69
End: 2024-02-29
Payer: MEDICARE

## 2024-04-02 DIAGNOSIS — M54.50 LOW BACK PAIN, UNSPECIFIED BACK PAIN LATERALITY, UNSPECIFIED CHRONICITY, UNSPECIFIED WHETHER SCIATICA PRESENT: ICD-10-CM

## 2024-04-02 RX ORDER — GABAPENTIN 100 MG/1
200 CAPSULE ORAL
Qty: 60 CAPSULE | Refills: 0 | Status: SHIPPED | OUTPATIENT
Start: 2024-04-02 | End: 2024-04-02 | Stop reason: SDUPTHER

## 2024-04-02 RX ORDER — GABAPENTIN 100 MG/1
400 CAPSULE ORAL
Qty: 120 CAPSULE | Refills: 1 | Status: SHIPPED | OUTPATIENT
Start: 2024-04-02 | End: 2024-04-02 | Stop reason: SDUPTHER

## 2024-04-02 NOTE — TELEPHONE ENCOUNTER
S/w pt and advised that Gabapentin 400 mg daily HS dose was approved and script was updated. Pt appreciative.

## 2024-04-02 NOTE — TELEPHONE ENCOUNTER
S/w pt. Pt is requesting an increase in his Gabapentin with updated script which is able to be filled today if possible? Pt was taking 200 mg of gabapentin q HS for Testicular pain which he only has at HS. Per pt 200 mg HS was working great for a while then his testicular pain increase also he started taking 300-400 mg HS which started to provide relief but then went back to prescribed dose of 200 mg HS due to fear of running out of medication. Pt is currently out of medication and has been since this past Thursday.     Med was ordered as 200 mg HS daily with 5 refills but per pt Pharmacy is saying it's too sone for a refill.    Please advise- Regarding pt request to increase gabapentin to 400 mg HS daily and if pt needs to taper up since ran out of meds 6 days ago- Thank you
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
## 2024-04-02 NOTE — TELEPHONE ENCOUNTER
Patient stated he had pain and took more than his prescribed dose of 2x day. (up to 3-4 daily), pharmacy will not fill due to it being to soon, patient is requesting another script.    Reason for call:   [x] Refill   [] Prior Auth  [] Other:     Office:   [] PCP/Provider -   [x] Specialty/Provider - S&P/ Reinaldo    Medication: gabapentin (NEURONTIN) 100 mg capsule     Dose/Frequency:   200 mg, Oral, Daily at bedtime        Quantity: 60    Pharmacy: [id number] - [address]     Does the patient have enough for 3 days?   [] Yes   [x] No - Send as HP to POD

## 2024-04-03 RX ORDER — GABAPENTIN 100 MG/1
400 CAPSULE ORAL
Qty: 120 CAPSULE | Refills: 1 | Status: SHIPPED | OUTPATIENT
Start: 2024-04-03 | End: 2024-06-02

## 2024-08-12 ENCOUNTER — OFFICE VISIT (OUTPATIENT)
Dept: PAIN MEDICINE | Facility: CLINIC | Age: 69
End: 2024-08-12
Payer: MEDICARE

## 2024-08-12 VITALS
HEART RATE: 76 BPM | WEIGHT: 222 LBS | SYSTOLIC BLOOD PRESSURE: 155 MMHG | BODY MASS INDEX: 30.07 KG/M2 | HEIGHT: 72 IN | DIASTOLIC BLOOD PRESSURE: 82 MMHG

## 2024-08-12 DIAGNOSIS — G89.29 GROIN PAIN, CHRONIC, LEFT: Primary | ICD-10-CM

## 2024-08-12 DIAGNOSIS — M54.50 LOW BACK PAIN, UNSPECIFIED BACK PAIN LATERALITY, UNSPECIFIED CHRONICITY, UNSPECIFIED WHETHER SCIATICA PRESENT: ICD-10-CM

## 2024-08-12 DIAGNOSIS — R10.32 GROIN PAIN, CHRONIC, LEFT: Primary | ICD-10-CM

## 2024-08-12 PROCEDURE — 99214 OFFICE O/P EST MOD 30 MIN: CPT | Performed by: STUDENT IN AN ORGANIZED HEALTH CARE EDUCATION/TRAINING PROGRAM

## 2024-08-12 RX ORDER — GABAPENTIN 100 MG/1
400 CAPSULE ORAL
Qty: 120 CAPSULE | Refills: 1 | Status: SHIPPED | OUTPATIENT
Start: 2024-08-12 | End: 2024-10-11

## 2024-08-12 NOTE — PATIENT INSTRUCTIONS
Discussed ilioinguinal, iliohypogastric nerve block which would happen in the office under ultrasound guidance. Use local anesthetic and steroid.

## 2024-08-12 NOTE — PROGRESS NOTES
Pain Medicine Follow-Up Note    Assessment:  1. Groin pain, chronic, left    2. Low back pain, unspecified back pain laterality, unspecified chronicity, unspecified whether sciatica present      Patient is a pleasant six 8-year-old male presenting as a 6-month follow-up for left-sided groin pain.  Patient symptoms are same rating his pain as 8 out of 10 on numeric rating scale.  Patient's pain is worse at night and the pain is intermittent.  The quality pain is sharp, throbbing, pressure-like.  Patient is currently on gabapentin and Flexeril which he states improved his pain by 85%.    Patient still having left-sided groin pain which limits his sleep at times.  Did discuss left-sided ilioinguinal, iliohypogastric nerve block under ultrasound guidance in the office but patient would like to discuss with his wife before proceeding.  For symptomatic relief we will continue with gabapentin 400 mg nightly.  Patient has stopped Flexeril so we will discontinue this.  Plan:  Continue gabapentin 400 mg nightly    Can consider left ilioinguinal/iliohypogastric nerve block under ultrasound guidance in office   No orders of the defined types were placed in this encounter.      New Medications Ordered This Visit   Medications   • gabapentin (NEURONTIN) 100 mg capsule     Sig: Take 4 capsules (400 mg total) by mouth daily at bedtime     Dispense:  120 capsule     Refill:  1       My impressions and treatment recommendations were discussed in detail with the patient who verbalized understanding and had no further questions.      Complete risks and benefits including bleeding, infection, tissue reaction, nerve injury and allergic reaction were discussed. The approach was demonstrated using models and literature was provided. Verbal and written consent was obtained.      Follow-up is planned in four weeks time or sooner as warranted.  Discharge instructions were provided. I personally saw and examined the patient and I agree with  the above discussed plan of care.    History of Present Illness:    Isauro Vasques is a 68 y.o. male who presents to [facility] for interval re-evaluation of the above stated pain complaints. The patient has a past medical and chronic pain history as outlined in the assessment section.      Patient is a pleasant six 8-year-old male presenting as a 6-month follow-up for left-sided groin pain.  Patient symptoms are same rating his pain as 8 out of 10 on numeric rating scale.  Patient's pain is worse at night and the pain is intermittent.  The quality pain is sharp, throbbing, pressure-like.  Patient is currently on gabapentin and Flexeril which he states improved his pain by 85%.    Other than as stated above, the patient denies any interval changes in medications, medical condition, mental condition, symptoms, or allergies since the last office visit.         Review of Systems:    Review of Systems   Constitutional:  Negative for unexpected weight change.   HENT:  Negative for ear pain.    Eyes:  Negative for visual disturbance.   Respiratory:  Negative for shortness of breath and wheezing.    Gastrointestinal:  Negative for abdominal pain.   Musculoskeletal:  Positive for back pain and gait problem.        Pain in the groin   Neurological:  Negative for weakness and numbness.   Psychiatric/Behavioral:  Positive for sleep disturbance. Negative for decreased concentration.          Past Medical History:   Diagnosis Date   • Asthma    • Hypercholesterolemia        Past Surgical History:   Procedure Laterality Date   • BUNIONECTOMY Bilateral    • FOOT SURGERY Right    • INGUINAL HERNIA REPAIR     • SKIN CANCER EXCISION      Malignant Melanoma on back removed   • UMBILICAL HERNIA REPAIR     • VASECTOMY     • WISDOM TOOTH EXTRACTION         Family History   Problem Relation Age of Onset   • No Known Problems Mother    • No Known Problems Father        Social History     Occupational History   • Not  on file   Tobacco Use   • Smoking status: Never   • Smokeless tobacco: Never   Vaping Use   • Vaping status: Never Used   Substance and Sexual Activity   • Alcohol use: Yes     Comment: Social-Beer   • Drug use: Never   • Sexual activity: Not on file         Current Outpatient Medications:   •  allopurinol (ZYLOPRIM) 300 mg tablet, , Disp: , Rfl:   •  aspirin (ECOTRIN LOW STRENGTH) 81 mg EC tablet, Take by mouth daily, Disp: , Rfl:   •  atorvastatin (LIPITOR) 40 mg tablet, , Disp: , Rfl:   •  colchicine (COLCRYS) 0.6 mg tablet, if needed, Disp: , Rfl:   •  ezetimibe (ZETIA) 10 mg tablet, , Disp: , Rfl:   •  Fluticasone-Salmeterol (ADVAIR DISKUS IN), Inhale, Disp: , Rfl:   •  gabapentin (NEURONTIN) 100 mg capsule, Take 4 capsules (400 mg total) by mouth daily at bedtime, Disp: 120 capsule, Rfl: 1  •  ibuprofen (MOTRIN) 200 mg tablet, Take by mouth every 6 (six) hours as needed for mild pain, Disp: , Rfl:   •  levocetirizine (XYZAL) 5 MG tablet, , Disp: , Rfl:   •  montelukast (SINGULAIR) 10 mg tablet, , Disp: , Rfl:   •  Omega-3 Fatty Acids (fish oil) 1,000 mg, Take by mouth daily, Disp: , Rfl:   •  Ventolin  (90 Base) MCG/ACT inhaler, if needed, Disp: , Rfl:   •  cyclobenzaprine (FLEXERIL) 5 mg tablet, Take 1 tablet (5 mg total) by mouth 2 (two) times a day as needed for muscle spasms, Disp: 60 tablet, Rfl: 5    Allergies   Allergen Reactions   • Zithromax [Azithromycin] Rash       Physical Exam:    /82   Pulse 76   Ht 6' (1.829 m)   Wt 101 kg (222 lb)   BMI 30.11 kg/m²     Constitutional:normal, well developed, well nourished, alert, in no distress and non-toxic and no overt pain behavior.  Eyes:anicteric  HEENT:grossly intact  Neck:supple, symmetric, trachea midline and no masses   Pulmonary:even and unlabored  Cardiovascular:No edema or pitting edema present  Skin:Normal without rashes or lesions and well hydrated  Psychiatric:Mood and affect appropriate  Neurologic:Cranial Nerves II-XII grossly  intact  Musculoskeletal:normal gait.       Imaging  No orders to display         No orders of the defined types were placed in this encounter.

## 2024-08-15 ENCOUNTER — TELEPHONE (OUTPATIENT)
Age: 69
End: 2024-08-15

## 2024-08-15 NOTE — TELEPHONE ENCOUNTER
Caller: makayla Box     Doctor: Reinaldo     Reason for call: pt stated he would like to move forward with the nerve block injection that was discussed at OV with Dr. Johnson     Call back#: [phone]

## 2024-08-15 NOTE — TELEPHONE ENCOUNTER
Danyel Najera MD  You; Elva Eason MA10 minutes ago (9:28 AM)       Yes. Elva please schedule for left ilioinguinal/iliohypogastric nerve block. In office under ultrasound, 30 minutes.  Best,  Danyel Najera

## 2024-09-04 ENCOUNTER — PROCEDURE VISIT (OUTPATIENT)
Dept: PAIN MEDICINE | Facility: CLINIC | Age: 69
End: 2024-09-04
Payer: MEDICARE

## 2024-09-04 DIAGNOSIS — R10.31 RIGHT GROIN PAIN: ICD-10-CM

## 2024-09-04 DIAGNOSIS — R10.32 LEFT GROIN PAIN: Primary | ICD-10-CM

## 2024-09-04 PROCEDURE — 64425 NJX AA&/STRD II IH NERVES: CPT | Performed by: STUDENT IN AN ORGANIZED HEALTH CARE EDUCATION/TRAINING PROGRAM

## 2024-09-04 PROCEDURE — 76942 ECHO GUIDE FOR BIOPSY: CPT | Performed by: STUDENT IN AN ORGANIZED HEALTH CARE EDUCATION/TRAINING PROGRAM

## 2024-09-04 RX ORDER — LIDOCAINE HYDROCHLORIDE 10 MG/ML
3 INJECTION, SOLUTION INFILTRATION; PERINEURAL ONCE
Status: COMPLETED | OUTPATIENT
Start: 2024-09-04 | End: 2024-09-04

## 2024-09-04 RX ORDER — METHYLPREDNISOLONE ACETATE 40 MG/ML
40 INJECTION, SUSPENSION INTRA-ARTICULAR; INTRALESIONAL; INTRAMUSCULAR; SOFT TISSUE ONCE
Status: COMPLETED | OUTPATIENT
Start: 2024-09-04 | End: 2024-09-04

## 2024-09-04 RX ORDER — BUPIVACAINE HYDROCHLORIDE 2.5 MG/ML
5 INJECTION, SOLUTION EPIDURAL; INFILTRATION; INTRACAUDAL ONCE
Status: COMPLETED | OUTPATIENT
Start: 2024-09-04 | End: 2024-09-04

## 2024-09-04 RX ADMIN — BUPIVACAINE HYDROCHLORIDE 5 ML: 2.5 INJECTION, SOLUTION EPIDURAL; INFILTRATION; INTRACAUDAL at 09:01

## 2024-09-04 RX ADMIN — LIDOCAINE HYDROCHLORIDE 3 ML: 10 INJECTION, SOLUTION INFILTRATION; PERINEURAL at 09:02

## 2024-09-04 RX ADMIN — METHYLPREDNISOLONE ACETATE 40 MG: 40 INJECTION, SUSPENSION INTRA-ARTICULAR; INTRALESIONAL; INTRAMUSCULAR; SOFT TISSUE at 09:02

## 2024-09-04 NOTE — PROGRESS NOTES
"Nerve block    Date/Time: 9/4/2024 9:00 AM    Performed by: Danyel Najera MD  Authorized by: Danyel Najera MD    Patient location:  [facility] Protocol:  Procedure performed by:  Consent: Verbal consent obtained.  Consent given by: parent  Time out: Immediately prior to procedure a \"time out\" was called to verify the correct patient, procedure, equipment, support staff and site/side marked as required.  Patient understanding: patient states understanding of the procedure being performed  Patient consent: the patient's understanding of the procedure matches consent given  Procedure consent: procedure consent matches procedure scheduled  Relevant documents: relevant documents present and verified  Test results: test results available and properly labeled  Site marked: the operative site was marked  Radiology Images displayed and confirmed. If images not available, report reviewed: imaging studies available  Patient identity confirmed: verbally with patient    Indications:     Indications:  Pain relief  Location:     Body area:  Trunk    Trunk nerve: iliohypogastric and ilioinguinal      Laterality:  Right  Pre-procedure details:     Skin preparation:  2% chlorhexidine    Preparation: Patient was prepped and draped in usual sterile fashion    Skin anesthesia (see MAR for exact dosages):     Skin anesthesia method:  Local infiltration    Local anesthetic:  Lidocaine 1% w/o epi  Procedure details (see MAR for exact dosages):     Block needle gauge:  22 G    Guidance: ultrasound      Anesthetic injected:  Bupivacaine 0.25% w/o epi    Steroid injected:  Methylprednisolone    Additive injected:  None    Injection procedure:  Anatomic landmarks identified, incremental injection, negative aspiration for blood and introduced needle  Post-procedure details:     Dressing:  None    Patient tolerance of procedure:  Tolerated well, no immediate complications      "

## 2024-09-18 ENCOUNTER — TELEPHONE (OUTPATIENT)
Dept: PAIN MEDICINE | Facility: MEDICAL CENTER | Age: 69
End: 2024-09-18

## 2025-03-20 ENCOUNTER — TELEPHONE (OUTPATIENT)
Age: 70
End: 2025-03-20

## 2025-03-20 NOTE — TELEPHONE ENCOUNTER
Caller: pt    Doctor: Dr. deluna    Reason for call: pt would like to get injections for his groin pain. Please advise.    Call back#: [phone]

## 2025-03-20 NOTE — TELEPHONE ENCOUNTER
Repeat Injections  Injection type: right groin injection  Last injection date:9/4/24  Last office visit: 8/12/24  Where is pain located: right groin  Is the pain the same area as before: Yes  Current pain level:7-8/10  How much % of relief did the last injection provide:90  Duration of relief from last injection: almost 6 months  When did pain return:about 2 weeks ago  Is the pain effecting your ADLs: pain is worse at night when sleeping    Blood thinners/NSAIDS? 81 ASA, motrin  Diabetes?No

## 2025-03-27 ENCOUNTER — PROCEDURE VISIT (OUTPATIENT)
Dept: PAIN MEDICINE | Facility: CLINIC | Age: 70
End: 2025-03-27
Payer: MEDICARE

## 2025-03-27 DIAGNOSIS — F07.81 POST CONCUSSIVE SYNDROME: ICD-10-CM

## 2025-03-27 DIAGNOSIS — G89.29 GROIN PAIN, CHRONIC, LEFT: Primary | ICD-10-CM

## 2025-03-27 DIAGNOSIS — R10.32 GROIN PAIN, CHRONIC, LEFT: Primary | ICD-10-CM

## 2025-03-27 DIAGNOSIS — R10.31 GROIN PAIN, RIGHT: ICD-10-CM

## 2025-03-27 PROCEDURE — 64425 NJX AA&/STRD II IH NERVES: CPT | Performed by: STUDENT IN AN ORGANIZED HEALTH CARE EDUCATION/TRAINING PROGRAM

## 2025-03-27 PROCEDURE — 76942 ECHO GUIDE FOR BIOPSY: CPT | Performed by: STUDENT IN AN ORGANIZED HEALTH CARE EDUCATION/TRAINING PROGRAM

## 2025-03-27 RX ORDER — LIDOCAINE HYDROCHLORIDE 10 MG/ML
3 INJECTION, SOLUTION INFILTRATION; PERINEURAL ONCE
Status: COMPLETED | OUTPATIENT
Start: 2025-03-27 | End: 2025-03-27

## 2025-03-27 RX ORDER — METHYLPREDNISOLONE ACETATE 40 MG/ML
40 INJECTION, SUSPENSION INTRA-ARTICULAR; INTRALESIONAL; INTRAMUSCULAR; SOFT TISSUE ONCE
Status: COMPLETED | OUTPATIENT
Start: 2025-03-27 | End: 2025-03-27

## 2025-03-27 RX ORDER — BUPIVACAINE HYDROCHLORIDE 2.5 MG/ML
5 INJECTION, SOLUTION EPIDURAL; INFILTRATION; INTRACAUDAL; PERINEURAL ONCE
Status: COMPLETED | OUTPATIENT
Start: 2025-03-27 | End: 2025-03-27

## 2025-03-27 RX ADMIN — BUPIVACAINE HYDROCHLORIDE 5 ML: 2.5 INJECTION, SOLUTION EPIDURAL; INFILTRATION; INTRACAUDAL; PERINEURAL at 09:12

## 2025-03-27 RX ADMIN — METHYLPREDNISOLONE ACETATE 40 MG: 40 INJECTION, SUSPENSION INTRA-ARTICULAR; INTRALESIONAL; INTRAMUSCULAR; SOFT TISSUE at 09:12

## 2025-03-27 RX ADMIN — LIDOCAINE HYDROCHLORIDE 3 ML: 10 INJECTION, SOLUTION INFILTRATION; PERINEURAL at 09:12

## 2025-03-27 NOTE — PROGRESS NOTES
"Nerve block    Date/Time: 3/27/2025 8:30 AM    Performed by: Danyel Najera MD  Authorized by: Danyel Najera MD    Patient location:  [facility] Protocol:  Procedure performed by:  Consent: Verbal consent obtained.  Consent given by: parent  Time out: Immediately prior to procedure a \"time out\" was called to verify the correct patient, procedure, equipment, support staff and site/side marked as required.  Patient understanding: patient states understanding of the procedure being performed  Patient consent: the patient's understanding of the procedure matches consent given  Procedure consent: procedure consent matches procedure scheduled  Relevant documents: relevant documents present and verified  Test results: test results available and properly labeled  Site marked: the operative site was marked  Radiology Images displayed and confirmed. If images not available, report reviewed: imaging studies available  Patient identity confirmed: verbally with patient    Indications:     Indications:  Pain relief  Location:     Body area:  Trunk    Trunk nerve: iliohypogastric and ilioinguinal      Laterality:  Right  Pre-procedure details:     Skin preparation:  2% chlorhexidine    Preparation: Patient was prepped and draped in usual sterile fashion    Skin anesthesia (see MAR for exact dosages):     Skin anesthesia method:  Local infiltration    Local anesthetic:  Lidocaine 1% w/o epi  Procedure details (see MAR for exact dosages):     Block needle gauge:  22 G    Guidance: ultrasound      Anesthetic injected:  Bupivacaine 0.25% w/o epi    Steroid injected:  Methylprednisolone    Additive injected:  None    Injection procedure:  Anatomic landmarks identified, incremental injection, negative aspiration for blood and introduced needle  Post-procedure details:     Dressing:  None    Patient tolerance of procedure:  Tolerated well, no immediate complications      "

## 2025-04-02 ENCOUNTER — TELEPHONE (OUTPATIENT)
Age: 70
End: 2025-04-02

## 2025-04-02 NOTE — TELEPHONE ENCOUNTER
Left message for patient reminding them of upcoming appointment with [facility], new office address and phone number.

## 2025-04-02 NOTE — PROGRESS NOTES
Name: Isauro Vasques      : 1955      MRN: [id number]  Encounter Provider: SHARON Kim  Encounter Date: 2025   Encounter department: [facility]  :  Assessment & Plan  Post concussive syndrome    Orders:    Ambulatory Referral to Neurology    CT head wo contrast; Future    XR spine cervical complete 6+ vw flex/ext/obl; Future    Chronic bilateral low back pain without sciatica    Orders:    cyclobenzaprine (FLEXERIL) 5 mg tablet; Take 1 tablet (5 mg total) by mouth 2 (two) times a day as needed for muscle spasms    Cervicogenic headache    Orders:    CT head wo contrast; Future    Ambulatory Referral to Physical Therapy; Future    Cervicalgia    Orders:    XR spine cervical complete 6+ vw flex/ext/obl; Future    Ambulatory Referral to Physical Therapy; Future    Head trauma, sequela    Orders:    CT head wo contrast; Future    XR spine cervical complete 6+ vw flex/ext/obl; Future      Patient Instructions   Resume gabapentin 400mg daily at bedtime  Restart on Flexeril 5mg twice a day as needed  Can try magnesium 400mg daily for headache  Increase oral hydration  4-6 glasses a day  Will get CT of the Head for occult bleed in light February Trauma, on baby aspirin  Will get cervical spine XR with Range Of Motion related to stiffness and limitation  Post fall.  Referral to PT for neck pain and tension headache related to the neck and fall.   Follow up with Neurology office in 4 months or sooner if needed.     History of Present Illness   Isauro Vasques is a 69-year-old male with PMH of asthma and hypercholesterolemia who is referred to the outpatient neurology office by pain management regarding postconcussive syndrome.  Patient reported 3 to 4 weeks ago having a fall on ice, hit the back of his head and noted since that period of time having slight pressure and headaches.  He did not go to the ER or to his PCP, he blew it off.  He had no imaging  "completed.  Patient told his pain management provider about the fall and injury and was recommended to see neurology to evaluate  Currently there are no labs or diagnostic studies to review prior to this appointment.    Patient reports to neurology office today, he is accompanied by his wife.  Patient stated he is for stroke was in October 2024 when he fell off a 14 foot ladder and hit the left side of his head.  He did go to the hospital was evaluated and released home.  He did not have any headaches and/or sequela from that fall, but did fracture a couple of ribs.  Patient stated he recently had another fall when he slipped on ice when he went to feed the deer, hit the back portion of his head and has had daily head and neck pain/discomfort since that injury.  Patient denies any significant history of headaches and/or migraines.  He has chronic low back pain and sees pain management for this.  He has had interventions on his low back but did not discuss his neck issues with pain management at his last office appointment.  There was concerns with continued headache therefore he was referred to neurology for further evaluation of that.  Patient stated since he fell and hit the back of his head, he has had a pressure type sensation in his neck with some limited range of motion.  He gets a tight sensation in the back of his head and feels like he is wearing a \"tight\".  Patient denies any significant issues causing him sleeplessness.  He does tend to have interrupted sleep patterns but usually related to his low back issues  He states that the tightness is uncomfortable, at maximum its intensity is 5/5 but denies any \"excruciating\" pain.  He states that his baseline body posture consists of a forward flexion and hunched posture of the head and the neck at baseline, he does have some mild range of motion issues at baseline but he has not taken much stock in his full range of motion prior to his injury.  At this point in " time he does have significant tightness and limitations mostly with right sided rotation as well as posterior extension of the neck.  Patient denies any overt pain just tightness.  Patient is Jaimes negative as 5/5 strength in his cervical spine and all of his extremities bilaterally and has no clonus noted.  Reflexes are intact, brisk in the upper extremities, 2+ in the lower extremities.  He has no ataxia on his exam and his coordination is intact.    Patient had previously been placed on Flexeril for low back muscle tightness, he tolerated it well and was on 5 mg twice a day.  Advised patient he can resume taking the Flexeril at this point in time  Patient also previously been on gabapentin 400 mg daily at bedtime, does not take that as he has had multiple injections for his low back and has not needed it at this point in time.  He does have it to take if needed.  Advised him to initiate gabapentin 400 mg daily at bedtime in combination with his Flexeril for treatment of his cervicalgia and cervicogenic headache.  Patient has not done physical therapy for his neck and/or spine.  He has had therapy for his left shoulder and had good results from his treatments.  Willing to resume therapies for evaluation of cervicalgia and cervicogenic headache.  Patient has not had any diagnostics, he continues to have some headache related to his injury.  He notes when changes positions it will increase the intensity and he will have nausea, he has not had vomiting.  At times he will also get a sensation of dizziness but is not constant or continued.    Patient states that he has taken 600 mg of Advil once a day previously for the headache and tightness, did help a little bit however he continued to have tightness and fullness in the neck.  Patient has not had any diagnostic studies and/or x-rays regarding the neck and over the head since his injury.  Patient is not having any significant neurological deficits related to his  head and neck pain, he is not having any pain characteristics concerning for SDH/SAH however given persistent tightness and discomfort, will get a CT of the head for further evaluation.  Also will get x-rays of the cervical spine with range of motion due to his movement limitations and tightness.  Patient has not done any physical therapy, referral has been provided for cervical range of motion, strengthening and modalities regarding cervicalgia and cervicogenic headache.  Would recommend patient have discussion with pain management after diagnostics are completed for other evaluations of the cervical spine.    Patient will contact the neurology office if he is not having any alleviation of the medication regimen, in the meantime he will follow-up in person in the office in 4 months or sooner if needed.                   Review of Systems   Constitutional:  Negative for chills and fever.   HENT:  Negative for ear pain and sore throat.    Eyes:  Negative for pain and visual disturbance.   Respiratory:  Negative for cough and shortness of breath.    Cardiovascular:  Negative for chest pain and palpitations.   Gastrointestinal:  Positive for nausea. Negative for abdominal pain and vomiting.   Genitourinary:  Negative for dysuria and hematuria.   Musculoskeletal:  Positive for back pain and neck stiffness. Negative for arthralgias.   Skin:  Negative for color change and rash.   Neurological:  Positive for dizziness and headaches. Negative for seizures and syncope.   All other systems reviewed and are negative.   I have personally reviewed the MA's review of systems and made changes as necessary.        Past Medical History:   Diagnosis Date    Asthma     Hypercholesterolemia        Past Surgical History:   Procedure Laterality Date    BUNIONECTOMY Bilateral     FOOT SURGERY Right     INGUINAL HERNIA REPAIR      SKIN CANCER EXCISION      Malignant Melanoma on back removed    UMBILICAL HERNIA REPAIR      VASECTOMY       WISDOM TOOTH EXTRACTION         Social History     Socioeconomic History    Marital status: /Civil Union     Spouse name: None    Number of children: None    Years of education: None    Highest education level: None   Occupational History    None   Tobacco Use    Smoking status: Never    Smokeless tobacco: Never   Vaping Use    Vaping status: Never Used   Substance and Sexual Activity    Alcohol use: Yes     Comment: Social-Beer    Drug use: Never    Sexual activity: None   Other Topics Concern    None   Social History Narrative    None     Social Drivers of Health     Financial Resource Strain: Not on file   Food Insecurity: Not on file   Transportation Needs: Not on file   Physical Activity: Not on file   Stress: Not on file   Social Connections: Not on file   Intimate Partner Violence: Not on file   Housing Stability: Not on file       Family History   Problem Relation Age of Onset    No Known Problems Mother     No Known Problems Father          Current Outpatient Medications:     allopurinol (ZYLOPRIM) 300 mg tablet, , Disp: , Rfl:     aspirin (ECOTRIN LOW STRENGTH) 81 mg EC tablet, Take by mouth daily, Disp: , Rfl:     atorvastatin (LIPITOR) 40 mg tablet, , Disp: , Rfl:     colchicine (COLCRYS) 0.6 mg tablet, if needed, Disp: , Rfl:     cyclobenzaprine (FLEXERIL) 5 mg tablet, Take 1 tablet (5 mg total) by mouth 2 (two) times a day as needed for muscle spasms, Disp: 60 tablet, Rfl: 5    ezetimibe (ZETIA) 10 mg tablet, , Disp: , Rfl:     Fluticasone-Salmeterol (ADVAIR DISKUS IN), Inhale, Disp: , Rfl:     gabapentin (NEURONTIN) 400 mg capsule, , Disp: , Rfl:     levocetirizine (XYZAL) 5 MG tablet, , Disp: , Rfl:     montelukast (SINGULAIR) 10 mg tablet, , Disp: , Rfl:     Omega-3 Fatty Acids (fish oil) 1,000 mg, Take by mouth daily, Disp: , Rfl:     Protonix 40 MG tablet, , Disp: , Rfl:     Ventolin  (90 Base) MCG/ACT inhaler, if needed, Disp: , Rfl:     ibuprofen (MOTRIN) 200 mg tablet, Take by  mouth every 6 (six) hours as needed for mild pain (Patient not taking: Reported on 4/4/2025), Disp: , Rfl:     Allergies   Allergen Reactions    Zithromax [Azithromycin] Rash        Blood pressure 152/90, pulse 81, height 6' (1.829 m), weight 102 kg (225 lb).       Objective   /90 (BP Location: Left arm, Patient Position: Sitting, Cuff Size: Large)   Pulse 81   Ht 6' (1.829 m)   Wt 102 kg (225 lb)   BMI 30.52 kg/m²     Physical Exam  Vitals reviewed.   Constitutional:       Appearance: Normal appearance. He is well-developed.   HENT:      Head: Normocephalic.      Right Ear: Hearing normal.      Left Ear: Hearing normal.      Nose: Nose normal.      Mouth/Throat:      Mouth: Mucous membranes are moist.   Eyes:      General: Lids are normal.      Extraocular Movements: Extraocular movements intact.      Conjunctiva/sclera: Conjunctivae normal.      Pupils: Pupils are equal, round, and reactive to light.   Pulmonary:      Effort: Pulmonary effort is normal. No respiratory distress.   Abdominal:      Palpations: Abdomen is soft.      Tenderness: There is no abdominal tenderness.   Musculoskeletal:      Cervical back: Decreased range of motion.   Skin:     General: Skin is warm and dry.   Neurological:      Mental Status: He is alert.      Motor: Motor strength is normal.     Coordination: Romberg sign negative.      Deep Tendon Reflexes: Reflexes are normal and symmetric.   Psychiatric:         Attention and Perception: Attention and perception normal.         Mood and Affect: Mood and affect normal.         Speech: Speech normal.         Behavior: Behavior normal. Behavior is cooperative.         Thought Content: Thought content normal.         Cognition and Memory: Cognition and memory normal.         Judgment: Judgment normal.       Neurological Exam  Mental Status  Alert. Oriented to person, place, time and situation. Memory is normal. Recent and remote memory are intact. Speech is normal. Language is  fluent with no aphasia. Attention and concentration are normal. Fund of knowledge is appropriate for level of education.    Cranial Nerves  CN II: Visual acuity is normal. Visual fields full to confrontation.  CN III, IV, VI: Extraocular movements intact bilaterally. Normal lids and orbits bilaterally. Pupils equal round and reactive to light bilaterally.  CN V: Facial sensation is normal.  CN VII: Full and symmetric facial movement.  CN VIII:  Right: Hearing is normal.  Left: Hearing is normal.  CN IX, X: Palate elevates symmetrically. Normal gag reflex.  CN XI: Shoulder shrug strength is normal.  CN XII: Tongue midline without atrophy or fasciculations.    Motor  Normal muscle bulk throughout. Normal muscle tone. The following abnormal movements were seen: + mild action tremor of the Rt index finger.   Strength is 5/5 throughout all four extremities.    Sensory  Light touch is normal in upper and lower extremities. Temperature is normal in upper and lower extremities. Vibration is normal in upper and lower extremities. Proprioception is normal in upper and lower extremities.     Reflexes  Deep tendon reflexes are 2+ and symmetric in all four extremities.    Right pathological reflexes: Ryann's absent. Ankle clonus absent.  Left pathological reflexes: Ryann's absent. Ankle clonus absent.    Coordination  Right: Finger-to-nose normal. Rapid alternating movement normal. Heel-to-shin normal.Left: Finger-to-nose normal. Rapid alternating movement normal. Heel-to-shin normal.    Gait  Casual gait is normal including stance, stride, and arm swing.Normal toe walking. Normal heel walking. Normal tandem gait. Romberg is absent. Able to rise from chair without using arms.      Radiology Results Review: I have reviewed radiology reports from 2016 including: CT head.    Administrative Statements   I have spent a total time of 45 minutes in caring for this patient on the day of the visit/encounter including Risks and  benefits of tx options, Instructions for management, Patient and family education, Counseling / Coordination of care, Documenting in the medical record, Reviewing/placing orders in the medical record (including tests, medications, and/or procedures), and Obtaining or reviewing history  .

## 2025-04-04 ENCOUNTER — OFFICE VISIT (OUTPATIENT)
Age: 70
End: 2025-04-04
Payer: MEDICARE

## 2025-04-04 VITALS
HEART RATE: 81 BPM | WEIGHT: 225 LBS | DIASTOLIC BLOOD PRESSURE: 90 MMHG | HEIGHT: 72 IN | SYSTOLIC BLOOD PRESSURE: 152 MMHG | BODY MASS INDEX: 30.48 KG/M2

## 2025-04-04 DIAGNOSIS — S09.90XS HEAD TRAUMA, SEQUELA: ICD-10-CM

## 2025-04-04 DIAGNOSIS — F07.81 POST CONCUSSIVE SYNDROME: ICD-10-CM

## 2025-04-04 DIAGNOSIS — M54.50 CHRONIC BILATERAL LOW BACK PAIN WITHOUT SCIATICA: ICD-10-CM

## 2025-04-04 DIAGNOSIS — M54.2 CERVICALGIA: ICD-10-CM

## 2025-04-04 DIAGNOSIS — G44.86 CERVICOGENIC HEADACHE: Primary | ICD-10-CM

## 2025-04-04 DIAGNOSIS — G89.29 CHRONIC BILATERAL LOW BACK PAIN WITHOUT SCIATICA: ICD-10-CM

## 2025-04-04 PROCEDURE — 99204 OFFICE O/P NEW MOD 45 MIN: CPT | Performed by: NURSE PRACTITIONER

## 2025-04-04 PROCEDURE — G2211 COMPLEX E/M VISIT ADD ON: HCPCS | Performed by: NURSE PRACTITIONER

## 2025-04-04 RX ORDER — CYCLOBENZAPRINE HCL 5 MG
5 TABLET ORAL 2 TIMES DAILY PRN
Qty: 60 TABLET | Refills: 5 | Status: SHIPPED | OUTPATIENT
Start: 2025-04-04 | End: 2025-10-01

## 2025-04-04 RX ORDER — PANTOPRAZOLE SODIUM 40 MG
TABLET, DELAYED RELEASE (ENTERIC COATED) ORAL
COMMUNITY
Start: 2025-01-01

## 2025-04-04 RX ORDER — GABAPENTIN 400 MG/1
CAPSULE ORAL
COMMUNITY
Start: 2025-03-31

## 2025-04-04 NOTE — ASSESSMENT & PLAN NOTE
Orders:    XR spine cervical complete 6+ vw flex/ext/obl; Future    Ambulatory Referral to Physical Therapy; Future

## 2025-04-04 NOTE — PATIENT INSTRUCTIONS
Resume gabapentin 400mg daily at bedtime  Restart on Flexeril 5mg twice a day as needed  Can try magnesium 400mg daily for headache  Increase oral hydration  4-6 glasses a day  Will get CT of the Head for occult bleed in light February Trauma, on baby aspirin  Will get cervical spine XR with Range Of Motion related to stiffness and limitation  Post fall.  Referral to PT for neck pain and tension headache related to the neck and fall.   Follow up with Neurology office in 4 months or sooner if needed.

## 2025-04-04 NOTE — ASSESSMENT & PLAN NOTE
Orders:    CT head wo contrast; Future    XR spine cervical complete 6+ vw flex/ext/obl; Future

## 2025-04-04 NOTE — ASSESSMENT & PLAN NOTE
Orders:    cyclobenzaprine (FLEXERIL) 5 mg tablet; Take 1 tablet (5 mg total) by mouth 2 (two) times a day as needed for muscle spasms

## 2025-04-10 ENCOUNTER — TELEPHONE (OUTPATIENT)
Dept: PAIN MEDICINE | Facility: MEDICAL CENTER | Age: 70
End: 2025-04-10

## 2025-04-11 ENCOUNTER — HOSPITAL ENCOUNTER (OUTPATIENT)
Dept: RADIOLOGY | Facility: HOSPITAL | Age: 70
Discharge: HOME | End: 2025-04-11
Payer: MEDICARE

## 2025-04-11 DIAGNOSIS — G44.86 CERVICOGENIC HEADACHE: ICD-10-CM

## 2025-04-11 DIAGNOSIS — M54.2 CERVICALGIA: ICD-10-CM

## 2025-04-11 DIAGNOSIS — F07.81 POST CONCUSSIVE SYNDROME: ICD-10-CM

## 2025-04-11 DIAGNOSIS — S09.90XS HEAD TRAUMA, SEQUELA: ICD-10-CM

## 2025-04-11 PROCEDURE — 72052 X-RAY EXAM NECK SPINE 6/>VWS: CPT

## 2025-04-11 PROCEDURE — 70450 CT HEAD/BRAIN W/O DYE: CPT

## 2025-04-14 ENCOUNTER — RESULTS FOLLOW-UP (OUTPATIENT)
Age: 70
End: 2025-04-14

## 2025-04-15 NOTE — TELEPHONE ENCOUNTER
I did speak to Patient Re: CT results. Patient is aware and expressed full understanding    Patient did not schedule PHYSICAL THERAPY or pain management he stated that he is waiting for the xray to come back to see what your recommendations are.    Meds are taking the edge off

## 2025-04-15 NOTE — TELEPHONE ENCOUNTER
----- Message from SHARON Molina sent at 4/14/2025  7:52 PM EDT -----  CT of the Head looks fine.  No fractures or bleeds seen on the study.   We are awaiting the Cervical Spine XR     I do not see PT scheduled for the neck so was wondering if he called to schedule with them. Also if he followed up with Pain Management re: his neck pain and discomforts with his limitations.    Can also see if his medication regimen I asked him to start is helping at all?    Thanks  Bang

## 2025-04-16 NOTE — TELEPHONE ENCOUNTER
----- Message from SHARON Molina sent at 4/15/2025  5:52 PM EDT -----  Please advise him that there is no need to delay his appointments for these things.  He will need to initiate them in general to help with alleviation of his symptoms.     His Cervical Xray show degenerative disc disease mostly in the lower part of the cervical spine with some spurring with partial narrowing.  Also has facet joint arthritis which he may benefit from injection with the Pain Management provider.     Im glad the meds are helping but ROM and eval by PM will help most.     Thanks  Bang

## 2025-04-16 NOTE — TELEPHONE ENCOUNTER
Patient is aware and did express understanding.    He would like to know if this could be the reason for the HA?

## 2025-04-16 NOTE — TELEPHONE ENCOUNTER
Patient is aware and expressed understanding. He will discuss things with his wife and get those appts scehduled

## 2025-04-24 ENCOUNTER — OFFICE VISIT (OUTPATIENT)
Dept: PAIN MEDICINE | Facility: CLINIC | Age: 70
End: 2025-04-24
Payer: MEDICARE

## 2025-04-24 DIAGNOSIS — M47.812 CERVICAL SPONDYLOSIS: ICD-10-CM

## 2025-04-24 DIAGNOSIS — G44.86 CERVICOGENIC HEADACHE: Primary | ICD-10-CM

## 2025-04-24 DIAGNOSIS — M54.2 CERVICALGIA: ICD-10-CM

## 2025-04-24 DIAGNOSIS — M54.12 CERVICAL RADICULOPATHY: ICD-10-CM

## 2025-04-24 PROCEDURE — G2211 COMPLEX E/M VISIT ADD ON: HCPCS | Performed by: STUDENT IN AN ORGANIZED HEALTH CARE EDUCATION/TRAINING PROGRAM

## 2025-04-24 PROCEDURE — 99214 OFFICE O/P EST MOD 30 MIN: CPT | Performed by: STUDENT IN AN ORGANIZED HEALTH CARE EDUCATION/TRAINING PROGRAM

## 2025-04-24 RX ORDER — CELECOXIB 100 MG/1
100 CAPSULE ORAL 2 TIMES DAILY PRN
Qty: 60 CAPSULE | Refills: 0 | Status: SHIPPED | OUTPATIENT
Start: 2025-04-24 | End: 2025-05-24

## 2025-04-24 NOTE — PROGRESS NOTES
Pain Medicine Follow-Up Note    Assessment:  1. Cervicogenic headache    2. Cervicalgia    3. Cervical radiculopathy    4. Cervical spondylosis      Patient is a pleasant 69-year-old male who presents as a follow-up visit after last being seen on 3/27/2025 for groin pain.  Patient now presenting with neck pain without radicular symptoms.  Patient was seen by neurology for concerns for Postconcussive syndrome but his CT of his head was unremarkable.  Patient to get a x-ray of his cervical spine with does show narrowing at multiple levels including right C3-C4, left C2-C3, C3-C4, C5-C6 with multilevel degenerative changes.  Patient is observed same rates pain as a 5-8 out of 10 on numeric rating scale.  The pain is constant and the quality pain is a throbbing, pressure-like sensation.  He has not been doing any conservative treatment but he was given a referral for physical therapy.  He has been managed on Flexeril along with gabapentin.    Patient with symptoms consistent with cervical spondylosis along with cervical radiculopathy.  Will start Celebrex 100 mg twice daily as needed.  Patient counseled he can increase this to 200 mg twice daily as needed if no benefit at this dose.  Patient also counseled to start physical therapy.  If no benefit from these can consider bilateral C3-C6 MBB to RFA pathway.  Plan:  No orders of the defined types were placed in this encounter.      New Medications Ordered This Visit   Medications   • celecoxib (CeleBREX) 100 mg capsule     Sig: Take 1 capsule (100 mg total) by mouth 2 (two) times a day as needed for mild pain or moderate pain     Dispense:  60 capsule     Refill:  0       My impressions and treatment recommendations were discussed in detail with the patient who verbalized understanding and had no further questions.        Follow-up is planned in four weeks time or sooner as warranted.  Discharge instructions were provided. I personally saw and examined the patient and I  agree with the above discussed plan of care.    History of Present Illness:    Isauro Vasques is a 69 y.o. male who presents to [facility] for interval re-evaluation of the above stated pain complaints. The patient has a past medical and chronic pain history as outlined in the assessment section. He was last seen on 3/27/2025.    Patient is a pleasant 69-year-old male who presents as a follow-up visit after last being seen on 3/27/2025 for groin pain.  Patient now presenting with neck pain without radicular symptoms.  Patient was seen by neurology for concerns for Postconcussive syndrome but his CT of his head was unremarkable.  Patient to get a x-ray of his cervical spine with does show narrowing at multiple levels including right C3-C4, left C2-C3, C3-C4, C5-C6 with multilevel degenerative changes.  Patient is observed same rates pain as a 5-8 out of 10 on numeric rating scale.  The pain is constant and the quality pain is a throbbing, pressure-like sensation.  He has not been doing any conservative treatment but he was given a referral for physical therapy.  He has been managed on Flexeril along with gabapentin.      Other than as stated above, the patient denies any interval changes in medications, medical condition, mental condition, symptoms, or allergies since the last office visit.         Review of Systems:    Review of Systems   Constitutional:  Negative for unexpected weight change.   HENT:  Negative for ear pain.    Eyes:  Negative for visual disturbance.   Respiratory:  Negative for shortness of breath and wheezing.    Gastrointestinal:  Negative for abdominal pain.   Musculoskeletal:  Positive for back pain, gait problem, neck pain and neck stiffness.        Decreased ROM, joint  and muscle pain   Neurological:  Positive for light-headedness and headaches. Negative for numbness.   Psychiatric/Behavioral:  Positive for sleep disturbance. Negative for decreased concentration.           Past Medical History:   Diagnosis Date   • Asthma    • Hypercholesterolemia        Past Surgical History:   Procedure Laterality Date   • BUNIONECTOMY Bilateral    • FOOT SURGERY Right    • INGUINAL HERNIA REPAIR     • SKIN CANCER EXCISION      Malignant Melanoma on back removed   • UMBILICAL HERNIA REPAIR     • VASECTOMY     • WISDOM TOOTH EXTRACTION         Family History   Problem Relation Age of Onset   • No Known Problems Mother    • No Known Problems Father        Social History     Occupational History   • Not on file   Tobacco Use   • Smoking status: Never   • Smokeless tobacco: Never   Vaping Use   • Vaping status: Never Used   Substance and Sexual Activity   • Alcohol use: Yes     Comment: Social-Beer   • Drug use: Never   • Sexual activity: Not on file         Current Outpatient Medications:   •  allopurinol (ZYLOPRIM) 300 mg tablet, , Disp: , Rfl:   •  aspirin (ECOTRIN LOW STRENGTH) 81 mg EC tablet, Take by mouth daily, Disp: , Rfl:   •  atorvastatin (LIPITOR) 40 mg tablet, , Disp: , Rfl:   •  celecoxib (CeleBREX) 100 mg capsule, Take 1 capsule (100 mg total) by mouth 2 (two) times a day as needed for mild pain or moderate pain, Disp: 60 capsule, Rfl: 0  •  colchicine (COLCRYS) 0.6 mg tablet, if needed, Disp: , Rfl:   •  cyclobenzaprine (FLEXERIL) 5 mg tablet, Take 1 tablet (5 mg total) by mouth 2 (two) times a day as needed for muscle spasms, Disp: 60 tablet, Rfl: 5  •  ezetimibe (ZETIA) 10 mg tablet, , Disp: , Rfl:   •  Fluticasone-Salmeterol (ADVAIR DISKUS IN), Inhale, Disp: , Rfl:   •  gabapentin (NEURONTIN) 400 mg capsule, , Disp: , Rfl:   •  levocetirizine (XYZAL) 5 MG tablet, , Disp: , Rfl:   •  montelukast (SINGULAIR) 10 mg tablet, , Disp: , Rfl:   •  Omega-3 Fatty Acids (fish oil) 1,000 mg, Take by mouth daily, Disp: , Rfl:   •  Protonix 40 MG tablet, , Disp: , Rfl:   •  Ventolin  (90 Base) MCG/ACT inhaler, if needed, Disp: , Rfl:   •  ibuprofen (MOTRIN) 200 mg tablet, Take by  mouth every 6 (six) hours as needed for mild pain (Patient not taking: Reported on 4/24/2025), Disp: , Rfl:     Allergies   Allergen Reactions   • Zithromax [Azithromycin] Rash       Physical Exam:    There were no vitals taken for this visit.    Constitutional:normal, well developed, well nourished, alert, in no distress and non-toxic and no overt pain behavior.  Eyes:anicteric  HEENT:grossly intact  Neck:supple, symmetric, trachea midline and no masses   Pulmonary:even and unlabored  Cardiovascular:No edema or pitting edema present  Skin:Normal without rashes or lesions and well hydrated  Psychiatric:Mood and affect appropriate  Neurologic:Cranial Nerves II-XII grossly intact  Musculoskeletal:normal gait. TTP in right cervical paraspinal muscles with taut bands appreciated and positive jump sign. Pain with rotation of cervical spine. Negative Spurling's. Paresthesias in RUE.       Imaging  XR SPINE CERVICAL COMPLETE 6+ VW FLEX /EXT /OBL     INDICATION: F07.81: Postconcussional syndrome  M54.2: Cervicalgia  S09.90XS: Unspecified injury of head, sequela.     COMPARISON: 7/8/2016     FINDINGS:     No fracture.     Flexion and extension views demonstrate no abnormal motion of the cervical spine. There is minimal anterolisthesis at C2-C3, unchanged during positioning. In neutral position there is mild kyphosis.     There is multilevel degenerative disc disease of the cervical spine most advanced at C5-C6. There is bilateral facet joint osteoarthritis.     There is partial narrowing of the right C3-C4 and left C2-C3, C3-C4, C5-C6 neuroforamina secondary to spurring     Normal prevertebral soft tissues.     Clear lung apices.     IMPRESSION:     Degenerative disc disease and arthritis of the cervical spine as above described.     No abnormal motion during flexion and extension  No orders to display         No orders of the defined types were placed in this encounter.

## 2025-05-05 ENCOUNTER — EVALUATION (OUTPATIENT)
Facility: CLINIC | Age: 70
End: 2025-05-05
Payer: MEDICARE

## 2025-05-05 DIAGNOSIS — M54.2 CERVICALGIA: ICD-10-CM

## 2025-05-05 DIAGNOSIS — G44.86 CERVICOGENIC HEADACHE: ICD-10-CM

## 2025-05-05 PROCEDURE — 97162 PT EVAL MOD COMPLEX 30 MIN: CPT | Performed by: PHYSICAL THERAPIST

## 2025-05-05 PROCEDURE — 97110 THERAPEUTIC EXERCISES: CPT | Performed by: PHYSICAL THERAPIST

## 2025-05-05 NOTE — PROGRESS NOTES
PT Evaluation     Today's date: 2025  Patient name: Isauro Vasques  : 1955  MRN: [id number]  Referring provider: Bang Coon  Dx:   Encounter Diagnosis     ICD-10-CM    1. Cervicogenic headache  G44.86 Ambulatory Referral to Physical Therapy      2. Cervicalgia  M54.2 Ambulatory Referral to Physical Therapy            Assessment  Assessment details: Patient is a 69 y.o. Male who presents to skilled outpatient PT with complaints of cervical pain. Patient reports he had a fall on ice in February resulting in constant headaches and cervical pain. He states that last week he saw his doctor who prescribed anti-inflammatory medication and his headaches have subsided but he continues to have cervical pain. Coordination screening is WNL. Cervical AROM reveals minimal limitations with B/L rotation and extension along with moderate limitations with B/L lateral flexion, reporting increased pain/stiffness at end-ROM. Cervical palpation reveals hypertonic R Upper Trapezius, L Upper Trapezius, R Levator Scapulae, L Levator Scapulae, R SCM, and L SCM. When seated patient displays forward head and forward rounded shoulders posture. Began patient with cervical stretching, providing cueing to maintain appropriate exercise form. Also provided patient with HEP and reviewed all exercises for understanding. Patient will benefit from skilled PT services to reduce his cervical pain, improve his posture and mobility, and help him return to his PLOF.     Patient verbalized understanding of POC.    Please contact me if you have any questions or recommendations. Thank you for the referral and the opportunity to share in Isauro Vasquse's care.      Impairments: Abnormal muscle tone, Abnormal or restricted ROM, Impaired physical strength, Lacks appropriate HEP, Poor posture, and Pain with function  Understanding of Dx/Px/POC: Good  Prognosis:  Good      Goals    Headache Short Term Goals (4 weeks):  - Patient will be able to perform cervical AROM with 1/10 pain at end range  - Patient will demonstrate 25% improvements (minimal limitations) with all evaluated cervical AROM to improve function with ADLs  - Patient will be independent with simple HEP  - Patient will demonstrate improved soft tissue density t/o cervical region with independent self-release      Headache Long Term Goals (8 weeks):  - Patient will display decreased forward head and rounded shoulders to promote improved resting posture and cervical mobility  - Patient will be independent with complex HEP  - Patient will demonstrate normalized soft tissue t/o  - Patient will report HA symptoms lasting </= 25% of patient's awake hours to promote overall symptom reduction  - Patient will report HA </= 2 days per week  - Patient will report average HA intensity of 1/10 or less        Plan  Plan details:   Patient would benefit from: Skilled PT  Planned modality interventions: Biofeedback, Cryotherapy, and Thermotherapy: Hydrocollator Packs  Planned therapy interventions: Abdominal trunk stabilization, Balance/WB training, Breathing training, Body mechanics training, Coordination, Functional ROM exercises, HEP, Joint mobilization, Manual therapy, Motor coordination training, Neuromuscular re-education, Patient education, Postural training, Strengthening, Stretching, Therapeutic activities, Therapeutic exercises, and Therapeutic training  Frequency: 2x/wk  Duration in weeks:   Plan of Care beginning date: 5/5/2025  Plan of Care expiration date: 12 weeks - 7/28/2025  Treatment plan discussed with: Patient        Subjective Evaluation    History of Present Illness  Mechanism of injury: Patient reports in February he had a fall that caused a headache and neck pain ever since. Two weeks ago he began taking an anti inflammatory medication that has stopped his headaches and greatly improved his neck pain.  He currently reports minor neck stiffness that he rates 1-2/10.    Patient goal: Improve posture, reduce cervical     Pain  Current pain ratin/10  Location: cervical    Treatments  Previous treatment: PT (shoulder, groin)      Objective     Headache/Cervical Pain Objective    Coordination Screen  - Dysmetria: WNL  - Dysdiadochokinesia: WNL    Posture  - Seated: Fair, forward head, forward rounded shoulders    Cervical Spine AROM  - Flexion: WFL No pain  - Extension: Minimal limitation, stiffness  - R Rotation: Minimal limitation, stiffness  - L Rotation: Minimal limitation, stiffness  - R Lateral Flexion: Moderate limitation Pain at end range  - L Lateral Flexion: Moderate limitation Pain at end range    Palpation  - Hypertonic Muscles: R Upper Trapezius, L Upper Trapezius, R Levator Scapulae, L Levator Scapulae, R SCM, and L SCM        Precautions:   Past Medical History:   Diagnosis Date    Asthma     Hypercholesterolemia      TE:  - UT stretch: 30 sec, 3x ea  - LS stretch: 30 sec, 3x ea  - Cervical SNA sec, 5x ea    Access Code: [email or web address]  URL: [email or web address]  Date: 2025  Prepared by: Easton Montes Jr.    Exercises  - Seated Upper Trapezius Stretch  - 3 x daily - 7 x weekly - 1 sets - 3 reps - 30 hold  - Seated Levator Scapulae Stretch  - 3 x daily - 7 x weekly - 1 sets - 3 reps - 30 hold  - Seated Assisted Cervical Rotation with Towel  - 3 x daily - 7 x weekly - 1 sets - 5 reps - 10 hold

## 2025-05-08 ENCOUNTER — OFFICE VISIT (OUTPATIENT)
Facility: CLINIC | Age: 70
End: 2025-05-08
Attending: NURSE PRACTITIONER
Payer: MEDICARE

## 2025-05-08 DIAGNOSIS — G44.86 CERVICOGENIC HEADACHE: Primary | ICD-10-CM

## 2025-05-08 DIAGNOSIS — M54.2 CERVICALGIA: ICD-10-CM

## 2025-05-08 PROCEDURE — 97110 THERAPEUTIC EXERCISES: CPT | Performed by: PHYSICAL THERAPIST

## 2025-05-08 PROCEDURE — 97112 NEUROMUSCULAR REEDUCATION: CPT | Performed by: PHYSICAL THERAPIST

## 2025-05-08 NOTE — PROGRESS NOTES
Daily Note     Today's date: 2025  Patient name: Isauro Vasques  : 1955  MRN: [id number]  Referring provider: Bang Coon  Dx:   Encounter Diagnosis     ICD-10-CM    1. Cervicogenic headache  G44.86       2. Cervicalgia  M54.2                      Subjective: Patient reports he is having slight headache and cervical pain this morning      Objective: See treatment diary below    NMR:  - UT stretch: 30 sec, 3x ea  - LS stretch: 30 sec, 3x ea  - Cervical rotation stretch: 10x 10 sec holds  - Standing rows: 10x 5 sec holds, 2 sets  - Chin tucks: 10x 5 sec holds  - Standing doorway stretch: 30 sec, 3x ea  - Standing shoulder B/L TB ER: 10x 5 sec holds, 2 sets      Assessment: Patient tolerated treatment well. Continued with cervical stretching today, adding chin tucks today to promote upright posture. Standing doorway stretch also added today to promote pec stretching and assist with upright posture. Also added postural strengthening exercises today with TB, providing patient with cueing to maintain appropriate exercise form. Updated patient HEP below. Patient will benefit from skilled PT services to reduce his cervical pain, improve his posture and mobility, and help him return to his PLOF.         Plan: Continue per plan of care.         HEP    Access Code: [email or web address]  URL: [email or web address]  Date: 2025  Prepared by: Easton Montes Jr.    Exercises  - Seated Upper Trapezius Stretch  - 3 x daily - 7 x weekly - 1 sets - 3 reps - 30 hold  - Seated Levator Scapulae Stretch  - 3 x daily - 7 x weekly - 1 sets - 3 reps - 30 hold  - Seated Assisted Cervical Rotation with Towel  - 3 x daily - 7 x weekly - 1 sets - 5 reps - 10 hold    Access Code: [email or web address]  URL: [email or web address]  Date: 2025  Prepared by: Easton Montes Jr.    Exercises  - Doorway Pec Stretch at 90 Degrees Abduction  - 3 x daily - 7 x weekly - 1 sets - 3 reps - 30 hold  - Seated Cervical  Retraction  - 3 x daily - 7 x weekly - 1 sets - 10 reps - 5 hold

## 2025-05-12 ENCOUNTER — OFFICE VISIT (OUTPATIENT)
Facility: CLINIC | Age: 70
End: 2025-05-12
Attending: NURSE PRACTITIONER
Payer: MEDICARE

## 2025-05-12 DIAGNOSIS — M54.2 CERVICALGIA: ICD-10-CM

## 2025-05-12 DIAGNOSIS — G44.86 CERVICOGENIC HEADACHE: Primary | ICD-10-CM

## 2025-05-12 PROCEDURE — 97110 THERAPEUTIC EXERCISES: CPT | Performed by: PHYSICAL THERAPIST

## 2025-05-12 NOTE — PROGRESS NOTES
Daily Note     Today's date: 2025  Patient name: Isauro Vasques  : 1955  MRN: [id number]  Referring provider: Bang Coon  Dx:   Encounter Diagnosis     ICD-10-CM    1. Cervicogenic headache  G44.86       2. Cervicalgia  M54.2                      Subjective: Patient reports minor cervical pain, headache is mostly gone      Objective: See treatment diary below    NMR:  - UT stretch: 30 sec, 3x ea  - LS stretch: 30 sec, 3x ea  - Cervical rotation stretch: 10x 10 sec holds  - Cervical isometrics: 10x 5 sec holds ea (flex/ext/lat flex)  - Standing horizontal abduction w/ RTB: 10x 5 sec holds, 2 sets      Assessment: Patient tolerated treatment well. Cervical strengthening added today, completing isometrics into flexion, extension, and B/L lateral flexion. PT assistance provided to improve muscular activation and prevent upper trap compensation during horizontal abduction. Standing exercises completed against wall to promote upright posture. Patient will benefit from skilled PT services to reduce his cervical pain, improve his posture and mobility, and help him return to his PLOF.         Plan: Continue per plan of care.         HEP    Access Code: [email or web address]  URL: [email or web address]  Date: 2025  Prepared by: Easton Montes Jr.    Exercises  - Seated Upper Trapezius Stretch  - 3 x daily - 7 x weekly - 1 sets - 3 reps - 30 hold  - Seated Levator Scapulae Stretch  - 3 x daily - 7 x weekly - 1 sets - 3 reps - 30 hold  - Seated Assisted Cervical Rotation with Towel  - 3 x daily - 7 x weekly - 1 sets - 5 reps - 10 hold    Access Code: [email or web address]  URL: [email or web address]  Date: 2025  Prepared by: Easton Montes Jr.    Exercises  - Doorway Pec Stretch at 90 Degrees Abduction  - 3 x daily - 7 x weekly - 1 sets - 3 reps - 30 hold  - Seated Cervical Retraction  - 3 x daily - 7 x weekly - 1 sets - 10 reps - 5 hold

## 2025-05-15 ENCOUNTER — OFFICE VISIT (OUTPATIENT)
Facility: CLINIC | Age: 70
End: 2025-05-15
Attending: NURSE PRACTITIONER
Payer: MEDICARE

## 2025-05-15 DIAGNOSIS — M54.2 CERVICALGIA: ICD-10-CM

## 2025-05-15 DIAGNOSIS — G44.86 CERVICOGENIC HEADACHE: Primary | ICD-10-CM

## 2025-05-15 PROCEDURE — 97110 THERAPEUTIC EXERCISES: CPT | Performed by: PHYSICAL THERAPIST

## 2025-05-15 PROCEDURE — 97140 MANUAL THERAPY 1/> REGIONS: CPT | Performed by: PHYSICAL THERAPIST

## 2025-05-15 NOTE — PROGRESS NOTES
Daily Note     Today's date: 5/15/2025  Patient name: Isauro Vasques  : 1955  MRN: [id number]  Referring provider: Bang Coon  Dx:   Encounter Diagnosis     ICD-10-CM    1. Cervicogenic headache  G44.86       2. Cervicalgia  M54.2                      Subjective: Patient reports he is having increased left cervical and headache pain today      Objective: See treatment diary below    NMR:  - UT stretch: 30 sec, 3x ea  - LS stretch: 30 sec, 3x ea  - Cervical flexion/extension: 10x 2 sets, 5 sec hold  - Cervical rotation: 10x 10 sec holds    Manual:  - TPR: L UT, L SCM      Assessment: Patient tolerated treatment well. Adjusted cervical stretching today, reducing pressure on head and utilizing contralateral UE to stabilize on chair, with patient reporting reduction in overall pain level. TPR attempted today to reduce patients pain level, focusing on left upper trapezius and left SCM. Patient will benefit from skilled PT services to reduce his cervical pain, improve his posture and mobility, and help him return to his PLOF.         Plan: Continue per plan of care.         HEP    Access Code: [email or web address]  URL: [email or web address]  Date: 2025  Prepared by: Easton Montes Jr.    Exercises  - Seated Upper Trapezius Stretch  - 3 x daily - 7 x weekly - 1 sets - 3 reps - 30 hold  - Seated Levator Scapulae Stretch  - 3 x daily - 7 x weekly - 1 sets - 3 reps - 30 hold  - Seated Assisted Cervical Rotation with Towel  - 3 x daily - 7 x weekly - 1 sets - 5 reps - 10 hold    Access Code: [email or web address]  URL: [email or web address]  Date: 2025  Prepared by: Easton Montes Jr.    Exercises  - Doorway Pec Stretch at 90 Degrees Abduction  - 3 x daily - 7 x weekly - 1 sets - 3 reps - 30 hold  - Seated Cervical Retraction  - 3 x daily - 7 x weekly - 1 sets - 10 reps - 5 hold

## 2025-05-19 ENCOUNTER — OFFICE VISIT (OUTPATIENT)
Facility: CLINIC | Age: 70
End: 2025-05-19
Attending: NURSE PRACTITIONER
Payer: MEDICARE

## 2025-05-19 DIAGNOSIS — M54.2 CERVICALGIA: ICD-10-CM

## 2025-05-19 DIAGNOSIS — G44.86 CERVICOGENIC HEADACHE: Primary | ICD-10-CM

## 2025-05-19 PROCEDURE — 97110 THERAPEUTIC EXERCISES: CPT

## 2025-05-19 NOTE — PROGRESS NOTES
"Daily Note     Today's date: 2025  Patient name: Isauro Vasques  : 1955  MRN: [id number]  Referring provider: Bang Coon  Dx:   Encounter Diagnosis     ICD-10-CM    1. Cervicogenic headache  G44.86       2. Cervicalgia  M54.2             Start Time: 0855  Stop Time: 0930  Total time in clinic (min): 35 minutes    Subjective: Patient reports no pain he attributes due to prescription anti-inflammatory medication, which he will continue to take.      Objective: See treatment diary below    TE:  - supine cervical retraction 10 sec x 10   - pec minor unilateral stretch 30 sec x 3 bilat   -  prone (mid trap) ER component 10sec x 10 bilat   - prone lower trap 1-2\" lift, 2 sec hold x 10 bilat     None below today:   NMR:  - UT stretch: 30 sec, 3x ea  - LS stretch: 30 sec, 3x ea  - Cervical flexion/extension: 10x 2 sets, 5 sec hold  - Cervical rotation: 10x 10 sec holds    Manual:  - TPR: L UT, L SCM    Assessment: Patient tolerated treatment well. Patient required significant cueing for technique for supine cervical retraction.  Unilateral pec minor stretch instructed.  Pt unable to perform mid trap strengthening prone, so ER component was emphasized.  Patient demonstrates significant posterior/scapular weakness. Patient will benefit from skilled PT services to reduce his cervical pain, improve his posture and mobility, and help him return to his PLOF.         Plan: Continue per plan of care.         HEP    Access Code: [email or web address]  URL: [email or web address]  Date: 2025  Prepared by: Jennifer Marie  - Supine Cervical Retraction with Towel  - 1 x daily - 7 x weekly - 3 sets - 10 reps - 10sec hold  - Single Arm Doorway Pec Stretch at 90 Degrees Abduction  - 2 x daily - 7 x weekly - 1 sets - 3 reps - 30sec hold  - Standing Isometric Cervical Sidebending with Manual Resistance  - 1 x daily - 7 x weekly - 1 sets - 10 reps - 10sec hold  - Standing Isometric Cervical Flexion with Manual " Resistance  - 1 x daily - 7 x weekly - 1 sets - 10 reps - 10sec hold  - Standing Isometric Cervical Extension with Manual Resistance  - 7 x weekly - 1 sets - 10 reps - 10sec hold  - Seated Isometric Cervical Rotation  - 1 x daily - 7 x weekly - 3 sets - 10 reps - 10sec hold    Access Code: [email or web address]  URL: [email or web address]  Date: 05/05/2025  Prepared by: Easton Montes Jr.    Exercises  - Seated Upper Trapezius Stretch  - 3 x daily - 7 x weekly - 1 sets - 3 reps - 30 hold  - Seated Levator Scapulae Stretch  - 3 x daily - 7 x weekly - 1 sets - 3 reps - 30 hold  - Seated Assisted Cervical Rotation with Towel  - 3 x daily - 7 x weekly - 1 sets - 5 reps - 10 hold    Access Code: [email or web address]  URL: [email or web address]  Date: 05/08/2025  Prepared by: Easton Montes Jr.    Exercises  - Doorway Pec Stretch at 90 Degrees Abduction  - 3 x daily - 7 x weekly - 1 sets - 3 reps - 30 hold  - Seated Cervical Retraction  - 3 x daily - 7 x weekly - 1 sets - 10 reps - 5 hold

## 2025-05-22 ENCOUNTER — OFFICE VISIT (OUTPATIENT)
Facility: CLINIC | Age: 70
End: 2025-05-22
Attending: NURSE PRACTITIONER
Payer: MEDICARE

## 2025-05-22 DIAGNOSIS — G44.86 CERVICOGENIC HEADACHE: Primary | ICD-10-CM

## 2025-05-22 DIAGNOSIS — M54.2 CERVICALGIA: ICD-10-CM

## 2025-05-22 PROCEDURE — 97110 THERAPEUTIC EXERCISES: CPT | Performed by: PHYSICAL THERAPIST

## 2025-05-22 NOTE — PROGRESS NOTES
Daily Note     Today's date: 2025  Patient name: Isauro Vasques  : 1955  MRN: [id number]  Referring provider: Bang Coon  Dx:   Encounter Diagnosis     ICD-10-CM    1. Cervicogenic headache  G44.86       2. Cervicalgia  M54.2                        Subjective: Patient reports no pain he attributes due to prescription anti-inflammatory medication      Objective: See treatment diary below    TE:  - Standing cervical retraction 15 sec x 10   - pec minor unilateral stretch 30 sec x 3 bilat   - Standing scap squeeze: 20x 3 sec hold, YTB  - Seated cervical/thoracic extension: 10x 5 sec holds  - TB rows: 20x YTB    Manual:  - TPR: B/L UT      Assessment: Patient tolerated treatment well. Continued with scapular strengthening today, providing patient with tactile and visual cueing to prevent upper trap compensations. Encouraged patient to continue pec stretching at home. B/L UT TPR completed today to reduce hypertonicity and patient overall pain level. Patient will benefit from skilled PT services to reduce his cervical pain, improve his posture and mobility, and help him return to his PLOF.       Plan: Continue per plan of care.         HEP    Access Code: [email or web address]  URL: [email or web address]  Date: 2025  Prepared by: Jennifer Marie  - Supine Cervical Retraction with Towel  - 1 x daily - 7 x weekly - 3 sets - 10 reps - 10sec hold  - Single Arm Doorway Pec Stretch at 90 Degrees Abduction  - 2 x daily - 7 x weekly - 1 sets - 3 reps - 30sec hold  - Standing Isometric Cervical Sidebending with Manual Resistance  - 1 x daily - 7 x weekly - 1 sets - 10 reps - 10sec hold  - Standing Isometric Cervical Flexion with Manual Resistance  - 1 x daily - 7 x weekly - 1 sets - 10 reps - 10sec hold  - Standing Isometric Cervical Extension with Manual Resistance  - 7 x weekly - 1 sets - 10 reps - 10sec hold  - Seated Isometric Cervical Rotation  - 1 x daily - 7 x weekly - 3 sets - 10 reps - 10sec  hold    Access Code: [email or web address]  URL: [email or web address]  Date: 05/05/2025  Prepared by: Easton Montes Jr.    Exercises  - Seated Upper Trapezius Stretch  - 3 x daily - 7 x weekly - 1 sets - 3 reps - 30 hold  - Seated Levator Scapulae Stretch  - 3 x daily - 7 x weekly - 1 sets - 3 reps - 30 hold  - Seated Assisted Cervical Rotation with Towel  - 3 x daily - 7 x weekly - 1 sets - 5 reps - 10 hold    Access Code: [email or web address]  URL: [email or web address]  Date: 05/08/2025  Prepared by: Easton Montes Jr.    Exercises  - Doorway Pec Stretch at 90 Degrees Abduction  - 3 x daily - 7 x weekly - 1 sets - 3 reps - 30 hold  - Seated Cervical Retraction  - 3 x daily - 7 x weekly - 1 sets - 10 reps - 5 hold

## 2025-05-29 ENCOUNTER — OFFICE VISIT (OUTPATIENT)
Facility: CLINIC | Age: 70
End: 2025-05-29
Attending: NURSE PRACTITIONER
Payer: MEDICARE

## 2025-05-29 DIAGNOSIS — G44.86 CERVICOGENIC HEADACHE: Primary | ICD-10-CM

## 2025-05-29 DIAGNOSIS — M54.2 CERVICALGIA: ICD-10-CM

## 2025-05-29 PROCEDURE — 97110 THERAPEUTIC EXERCISES: CPT | Performed by: PHYSICAL THERAPIST

## 2025-05-29 PROCEDURE — 97140 MANUAL THERAPY 1/> REGIONS: CPT | Performed by: PHYSICAL THERAPIST

## 2025-05-29 NOTE — PROGRESS NOTES
Daily Note     Today's date: 2025  Patient name: Isauro Vasques  : 1955  MRN: [id number]  Referring provider: Bang Coon  Dx:   Encounter Diagnosis     ICD-10-CM    1. Cervicogenic headache  G44.86       2. Cervicalgia  M54.2                        Subjective: Patient reports he is doing alright today      Objective: See treatment diary below    TE:  - Standing cervical retraction w/ towel: 10x 5 sec hold, 2 sets  - Standing doorway stretch: 30 sec, 3x ea  - UT stretch: 30 sec, 3x ea  - LS stretch: 30 sec, 3x ea  - Scap squeeze: 10x 5 sec holds, 2 sets  - TB rows: 20x YTB      Manual:  - TPR: L SCM, B/L LS      Assessment: Patient tolerated treatment well. Cervical stretching and postural strengthening completed today, focusing on maintaining upright posture and reducing forward shoulder position. Tactile cueing provided to improve scapular retraction. B/L LS and L SCM TPR completed today to reduce hypertonicity and patient overall pain level. Patient will benefit from skilled PT services to reduce his cervical pain, improve his posture and mobility, and help him return to his PLOF.       Plan: Continue per plan of care.         HEP    Access Code: [email or web address]  URL: [email or web address]  Date: 2025  Prepared by: Jennifer Marie  - Supine Cervical Retraction with Towel  - 1 x daily - 7 x weekly - 3 sets - 10 reps - 10sec hold  - Single Arm Doorway Pec Stretch at 90 Degrees Abduction  - 2 x daily - 7 x weekly - 1 sets - 3 reps - 30sec hold  - Standing Isometric Cervical Sidebending with Manual Resistance  - 1 x daily - 7 x weekly - 1 sets - 10 reps - 10sec hold  - Standing Isometric Cervical Flexion with Manual Resistance  - 1 x daily - 7 x weekly - 1 sets - 10 reps - 10sec hold  - Standing Isometric Cervical Extension with Manual Resistance  - 7 x weekly - 1 sets - 10 reps - 10sec hold  - Seated Isometric Cervical Rotation  - 1 x daily - 7 x weekly - 3 sets - 10 reps - 10sec  hold    Access Code: [email or web address]  URL: [email or web address]  Date: 05/05/2025  Prepared by: Easton Montes Jr.    Exercises  - Seated Upper Trapezius Stretch  - 3 x daily - 7 x weekly - 1 sets - 3 reps - 30 hold  - Seated Levator Scapulae Stretch  - 3 x daily - 7 x weekly - 1 sets - 3 reps - 30 hold  - Seated Assisted Cervical Rotation with Towel  - 3 x daily - 7 x weekly - 1 sets - 5 reps - 10 hold    Access Code: [email or web address]  URL: [email or web address]  Date: 05/08/2025  Prepared by: Easton Montes Jr.    Exercises  - Doorway Pec Stretch at 90 Degrees Abduction  - 3 x daily - 7 x weekly - 1 sets - 3 reps - 30 hold  - Seated Cervical Retraction  - 3 x daily - 7 x weekly - 1 sets - 10 reps - 5 hold

## 2025-06-02 ENCOUNTER — APPOINTMENT (OUTPATIENT)
Facility: CLINIC | Age: 70
End: 2025-06-02
Attending: NURSE PRACTITIONER
Payer: MEDICARE

## 2025-06-03 ENCOUNTER — APPOINTMENT (OUTPATIENT)
Facility: CLINIC | Age: 70
End: 2025-06-03
Attending: NURSE PRACTITIONER
Payer: MEDICARE

## 2025-06-09 ENCOUNTER — EVALUATION (OUTPATIENT)
Facility: CLINIC | Age: 70
End: 2025-06-09
Attending: NURSE PRACTITIONER
Payer: MEDICARE

## 2025-06-09 DIAGNOSIS — G44.86 CERVICOGENIC HEADACHE: Primary | ICD-10-CM

## 2025-06-09 DIAGNOSIS — M54.2 CERVICALGIA: ICD-10-CM

## 2025-06-09 PROCEDURE — 97110 THERAPEUTIC EXERCISES: CPT | Performed by: PHYSICAL THERAPIST

## 2025-06-09 PROCEDURE — 97530 THERAPEUTIC ACTIVITIES: CPT | Performed by: PHYSICAL THERAPIST

## 2025-06-09 NOTE — PROGRESS NOTES
PT Progress Note    Today's date: 2025  Patient name: Isauro Vasques  : 1955  MRN: [id number]  Referring provider: Bang Coon*  Dx:   Encounter Diagnosis     ICD-10-CM    1. Cervicogenic headache  G44.86       2. Cervicalgia  M54.2             Assessment  Assessment details: Patient is a 69 y.o. Male who has been attending skilled outpatient PT with complaints of cervical pain. Patient reports he had a fall on ice in February resulting in constant headaches and cervical pain. Patient reports he has been taking anti-inflammatory medication which has helped to reduce his symptoms, however he has now finished medication and reports return of his symptoms. Cervical AROM reveals minimal limitations with B/L rotation and extension along with moderate limitations with B/L lateral flexion, reporting increased pain/stiffness at end-ROM. Cervical palpation reveals hypertonic Suboccipitals, R Upper Trapezius, L Upper Trapezius, R Levator Scapulae, L Levator Scapulae, R SCM, and L SCM. When seated patient displays forward head and forward rounded shoulders posture. Continued with cervical stretching and postural exercises along with manual therapy to reduce patients pain level, improve his mobility, and improve his posture. Updated HEP, reviewed all exercises with patient, and discussed safety of exercising at home, patient verbalized agreement. Patient plans to return to [facility] for further evaluation of his cervical and headache pain. Plan to continue with skilled PT services to focus on the aforementioned deficits. Discussed plan with patient who is in agreement. Patient will benefit from skilled PT services.       Patient verbalized understanding of POC.    Please contact me if you have any questions or recommendations. Thank you for the referral and the opportunity to share in Isauro Vasques's care.      Impairments: Abnormal muscle tone,  Abnormal or restricted ROM, Impaired physical strength, Lacks appropriate HEP, Poor posture, and Pain with function  Understanding of Dx/Px/POC: Good  Prognosis: Good      Goals    Headache Short Term Goals (4 weeks):  - Patient will be able to perform cervical AROM with 1/10 pain at end range  - Patient will demonstrate 25% improvements (minimal limitations) with all evaluated cervical AROM to improve function with ADLs  - Patient will be independent with simple HEP  - Patient will demonstrate improved soft tissue density t/o cervical region with independent self-release      Headache Long Term Goals (8 weeks):  - Patient will display decreased forward head and rounded shoulders to promote improved resting posture and cervical mobility  - Patient will be independent with complex HEP  - Patient will demonstrate normalized soft tissue t/o  - Patient will report HA symptoms lasting </= 25% of patient's awake hours to promote overall symptom reduction  - Patient will report HA </= 2 days per week  - Patient will report average HA intensity of 1/10 or less        Plan  Plan details:   Patient would benefit from: Skilled PT  Planned modality interventions: Biofeedback, Cryotherapy, and Thermotherapy: Hydrocollator Packs  Planned therapy interventions: Abdominal trunk stabilization, Balance/WB training, Breathing training, Body mechanics training, Coordination, Functional ROM exercises, HEP, Joint mobilization, Manual therapy, Motor coordination training, Neuromuscular re-education, Patient education, Postural training, Strengthening, Stretching, Therapeutic activities, Therapeutic exercises, and Therapeutic training  Frequency: 2x/wk  Duration in weeks:   Plan of Care beginning date: 5/5/2025  Plan of Care expiration date: 12 weeks - 7/28/2025  Treatment plan discussed with: Patient        Subjective Evaluation    History of Present Illness  Mechanism of injury: Patient reports in February he had a fall that caused a  headache and neck pain ever since. Two weeks ago he began taking an anti inflammatory medication that has stopped his headaches and greatly improved his neck pain. He currently reports minor neck stiffness that he rates 1-2/10.    Patient goal: Improve posture, reduce cervical     Pain  Current pain ratin/10  Location: cervical    Treatments  Previous treatment: PT (shoulder, groin)      Objective     Headache/Cervical Pain Objective    Coordination Screen  - Dysmetria: WNL  - Dysdiadochokinesia: WNL    Posture  - Seated: Fair, forward head, forward rounded shoulders    Cervical Spine AROM  - Flexion: WFL No pain  - Extension: Minimal limitation, stiffness  - R Rotation: Minimal limitation, stiffness  - L Rotation: Minimal limitation, stiffness  - R Lateral Flexion: Moderate limitation Pain at end range  - L Lateral Flexion: Moderate limitation Pain at end range    Palpation  - Hypertonic Muscles: R Upper Trapezius, L Upper Trapezius, R Levator Scapulae, L Levator Scapulae, R SCM, and L SCM        Precautions:   Past Medical History:   Diagnosis Date    Asthma     Hypercholesterolemia      TE:  - UT stretch: 30 sec, 3x ea  - LS stretch: 30 sec, 3x ea  - Cervical SNA sec, 5x ea  - Cervical rotation: 10x 5 sec holds  - Standing doorway stretch: 30 sec, 3x ea  - Chin tucks: 10x 2 sets, 5 sec hold    Manual:  - TPR Suboccipitals     Access Code: [email or web address]  URL: [email or web address]  Date: 2025  Prepared by: Easton Montes Jr.    Exercises  - Seated Upper Trapezius Stretch  - 3 x daily - 7 x weekly - 1 sets - 3 reps - 30 hold  - Seated Levator Scapulae Stretch  - 3 x daily - 7 x weekly - 1 sets - 3 reps - 30 hold  - Seated Assisted Cervical Rotation with Towel  - 3 x daily - 7 x weekly - 1 sets - 5 reps - 10 hold    Access Code: [email or web address]  URL: [email or web address]  Date: 2025  Prepared by: Easton Montes Jr.    Exercises  - Seated Scapular Retraction  - 1 x daily - 7 x weekly  - 3 sets - 10 reps  - Seated Cervical Retraction  - 1 x daily - 7 x weekly - 3 sets - 10 reps  - Doorway Pec Stretch at 90 Degrees Abduction  - 1 x daily - 7 x weekly - 3 sets - 10 reps  - Seated Upper Trapezius Stretch  - 1 x daily - 7 x weekly - 3 sets - 10 reps  - Seated Levator Scapulae Stretch  - 1 x daily - 7 x weekly - 3 sets - 10 reps

## 2025-06-10 ENCOUNTER — OFFICE VISIT (OUTPATIENT)
Facility: CLINIC | Age: 70
End: 2025-06-10
Attending: NURSE PRACTITIONER
Payer: MEDICARE

## 2025-06-10 DIAGNOSIS — M54.2 CERVICALGIA: ICD-10-CM

## 2025-06-10 DIAGNOSIS — G44.86 CERVICOGENIC HEADACHE: Primary | ICD-10-CM

## 2025-06-10 PROCEDURE — 97140 MANUAL THERAPY 1/> REGIONS: CPT | Performed by: PHYSICAL THERAPIST

## 2025-06-10 PROCEDURE — 97110 THERAPEUTIC EXERCISES: CPT | Performed by: PHYSICAL THERAPIST

## 2025-06-10 NOTE — PROGRESS NOTES
Daily Note     Today's date: 6/10/2025  Patient name: Isauro Vasques  : 1955  MRN: [id number]  Referring provider: Bang Coon  Dx:   Encounter Diagnosis     ICD-10-CM    1. Cervicogenic headache  G44.86       2. Cervicalgia  M54.2               Subjective: Patient reports he is doing alright today, reduced HA pain compared to previous session      Objective: See treatment diary below    TE:  - Seated cervical retraction: 10x 5 sec holds, 2 sets  - Seated cervical rotation: 10x 10 sec holds  - Standing doorway stretch: 30 sec, 5x ea    Manual:  - TPR: B/L SCM, B/L UT, Suboccipitals      Assessment: Patient tolerated treatment well. Continued with cervical and postural stretching, providing tactile and visual cueing to maintain appropriate positioning. TPR completed today on B/L SCM, B/L UT, and suboccipitals with patient reporting reduced pain post-treatment. Plan to place patient on PT hold until next doctor appointment. Provided patient with HEP and reviewed all exercises. Patient will continue to benefit from skilled PT services.       Plan: Continue per plan of care.         HEP    Access Code: [email or web address]  URL: [email or web address]  Date: 2025  Prepared by: Jennifer Marie  - Supine Cervical Retraction with Towel  - 1 x daily - 7 x weekly - 3 sets - 10 reps - 10sec hold  - Single Arm Doorway Pec Stretch at 90 Degrees Abduction  - 2 x daily - 7 x weekly - 1 sets - 3 reps - 30sec hold  - Standing Isometric Cervical Sidebending with Manual Resistance  - 1 x daily - 7 x weekly - 1 sets - 10 reps - 10sec hold  - Standing Isometric Cervical Flexion with Manual Resistance  - 1 x daily - 7 x weekly - 1 sets - 10 reps - 10sec hold  - Standing Isometric Cervical Extension with Manual Resistance  - 7 x weekly - 1 sets - 10 reps - 10sec hold  - Seated Isometric Cervical Rotation  - 1 x daily - 7 x weekly - 3 sets - 10 reps - 10sec hold    Access Code: [email or web address]  URL:  [email or web address]  Date: 05/05/2025  Prepared by: Easton Montes Jr.    Exercises  - Seated Upper Trapezius Stretch  - 3 x daily - 7 x weekly - 1 sets - 3 reps - 30 hold  - Seated Levator Scapulae Stretch  - 3 x daily - 7 x weekly - 1 sets - 3 reps - 30 hold  - Seated Assisted Cervical Rotation with Towel  - 3 x daily - 7 x weekly - 1 sets - 5 reps - 10 hold    Access Code: [email or web address]  URL: [email or web address]  Date: 05/08/2025  Prepared by: Easton Montes Jr.    Exercises  - Doorway Pec Stretch at 90 Degrees Abduction  - 3 x daily - 7 x weekly - 1 sets - 3 reps - 30 hold  - Seated Cervical Retraction  - 3 x daily - 7 x weekly - 1 sets - 10 reps - 5 hold    Access Code: [email or web address]  URL: [email or web address]  Date: 06/09/2025  Prepared by: Easton Montes Jr.    Exercises  - Seated Scapular Retraction  - 1 x daily - 7 x weekly - 3 sets - 10 reps  - Seated Cervical Retraction  - 1 x daily - 7 x weekly - 3 sets - 10 reps  - Doorway Pec Stretch at 90 Degrees Abduction  - 1 x daily - 7 x weekly - 3 sets - 10 reps  - Seated Upper Trapezius Stretch  - 1 x daily - 7 x weekly - 3 sets - 10 reps  - Seated Levator Scapulae Stretch  - 1 x daily - 7 x weekly - 3 sets - 10 reps

## 2025-06-17 ENCOUNTER — APPOINTMENT (OUTPATIENT)
Facility: CLINIC | Age: 70
End: 2025-06-17
Attending: NURSE PRACTITIONER
Payer: MEDICARE

## 2025-06-26 ENCOUNTER — OFFICE VISIT (OUTPATIENT)
Dept: PAIN MEDICINE | Facility: CLINIC | Age: 70
End: 2025-06-26
Payer: MEDICARE

## 2025-06-26 DIAGNOSIS — M54.2 CERVICALGIA: ICD-10-CM

## 2025-06-26 DIAGNOSIS — G44.86 CERVICOGENIC HEADACHE: ICD-10-CM

## 2025-06-26 PROCEDURE — G2211 COMPLEX E/M VISIT ADD ON: HCPCS | Performed by: STUDENT IN AN ORGANIZED HEALTH CARE EDUCATION/TRAINING PROGRAM

## 2025-06-26 PROCEDURE — 99214 OFFICE O/P EST MOD 30 MIN: CPT | Performed by: STUDENT IN AN ORGANIZED HEALTH CARE EDUCATION/TRAINING PROGRAM

## 2025-06-26 RX ORDER — CELECOXIB 100 MG/1
100 CAPSULE ORAL 2 TIMES DAILY PRN
Qty: 180 CAPSULE | Refills: 0 | Status: SHIPPED | OUTPATIENT
Start: 2025-06-26 | End: 2025-09-24

## 2025-06-26 NOTE — PROGRESS NOTES
Name: Isauro Vasques      : 1955      MRN: [id number]  Encounter Provider: Danyel Najera MD  Encounter Date: 2025   Encounter department: [facility]  :  Assessment & Plan  Cervicogenic headache    Orders:  •  celecoxib (CeleBREX) 100 mg capsule; Take 1 capsule (100 mg total) by mouth 2 (two) times a day as needed for mild pain or moderate pain  •  MRI cervical spine wo contrast; Future    Cervicalgia    Orders:  •  celecoxib (CeleBREX) 100 mg capsule; Take 1 capsule (100 mg total) by mouth 2 (two) times a day as needed for mild pain or moderate pain  •  MRI cervical spine wo contrast; Future    Patient is a pleasant 69-year-old male who presents as a follow-up visit after last being seen on 2025 for symptoms of cervical spondylosis along with cervicogenic headaches.  Patient states symptoms have improved with physical therapy along with use of Celebrex 100 mg twice daily as needed.  He did notice that when he stopped the Celebrex his headaches did not return and his neck was stiff.  He describes pain as intermittent, cramping, dull aching sensation and does interfere with sleeping.  He rates pain today as 8 out of 10 numeric rating scale.    Given patient has failed 6 weeks of conservative management think is reasonable to order an MRI to cervical spine to further evaluate.  Pending MRI results can still consider C3-C6 MBB to RFA pathway in the future.  For symptomatic relief we will continue with Celebrex 100 mg twice daily as needed.  90-day prescription provided in the office today.    Complete risks and benefits including bleeding, infection, tissue reaction, nerve injury and allergic reaction were discussed. The approach was demonstrated using models and literature was provided. Verbal and written consent was obtained.    My impressions and treatment recommendations were discussed in detail with the patient who verbalized understanding and had no further questions.   Discharge instructions were provided. I personally saw and examined the patient and I agree with the above discussed plan of care.    History of Present Illness     Isauro Vasques is a 69 y.o. male who presents for a follow up office visit in regards to No chief complaint on file..     Patient is a pleasant 69-year-old male who presents as a follow-up visit after last being seen on 4/24/2025 for symptoms of cervical spondylosis along with cervicogenic headaches.  Patient states symptoms have improved with physical therapy along with use of Celebrex 100 mg twice daily as needed.  He did notice that when he stopped the Celebrex his headaches did not return and his neck was stiff.  He describes pain as intermittent, cramping, dull aching sensation and does interfere with sleeping.  He rates pain today as 8 out of 10 numeric rating scale.        Opioid contract date    Last UDS Date: No results in last 5 years                    last taken on    Review of Systems   Constitutional:  Negative for unexpected weight change.   HENT:  Negative for ear pain.    Eyes:  Negative for visual disturbance.   Respiratory:  Negative for shortness of breath and wheezing.    Gastrointestinal:  Negative for abdominal pain.   Musculoskeletal:  Positive for neck pain and neck stiffness. Negative for back pain.        Decreased ROM, joint and muscle pain   Neurological:  Positive for headaches. Negative for weakness and numbness.   Psychiatric/Behavioral:  Positive for sleep disturbance. Negative for decreased concentration.        Medical History Reviewed by provider this encounter:  Tobacco  Allergies  Meds  Problems  Med Hx  Surg Hx  Fam Hx     .       Objective   There were no vitals taken for this visit.     Pain Score:   8  Physical Exam  Constitutional: normal, well developed, well nourished, alert, in no distress and non-toxic and no overt pain behavior.  Eyes: anicteric  HEENT: grossly intact  Neck: supple, symmetric, trachea  midline and no masses   Pulmonary: even and unlabored  Cardiovascular: No edema or pitting edema present  Skin: Normal without rashes or lesions and well hydrated  Psychiatric: Mood and affect appropriate  Neurologic: Cranial Nerves II-XII grossly intact  Musculoskeletal: normal gait. Mild pain with extension and rotation of the cervical spine.

## 2025-06-26 NOTE — ASSESSMENT & PLAN NOTE
Orders:  •  celecoxib (CeleBREX) 100 mg capsule; Take 1 capsule (100 mg total) by mouth 2 (two) times a day as needed for mild pain or moderate pain  •  MRI cervical spine wo contrast; Future

## 2025-07-07 ENCOUNTER — HOSPITAL ENCOUNTER (OUTPATIENT)
Dept: RADIOLOGY | Facility: HOSPITAL | Age: 70
Discharge: HOME | End: 2025-07-07
Attending: STUDENT IN AN ORGANIZED HEALTH CARE EDUCATION/TRAINING PROGRAM
Payer: MEDICARE

## 2025-07-07 DIAGNOSIS — M54.2 CERVICALGIA: ICD-10-CM

## 2025-07-07 DIAGNOSIS — G44.86 CERVICOGENIC HEADACHE: ICD-10-CM

## 2025-07-07 PROCEDURE — 72141 MRI NECK SPINE W/O DYE: CPT

## 2025-08-22 ENCOUNTER — TELEPHONE (OUTPATIENT)
Age: 70
End: 2025-08-22